# Patient Record
Sex: MALE | Race: WHITE | Employment: UNEMPLOYED | URBAN - METROPOLITAN AREA
[De-identification: names, ages, dates, MRNs, and addresses within clinical notes are randomized per-mention and may not be internally consistent; named-entity substitution may affect disease eponyms.]

---

## 2022-07-05 ENCOUNTER — APPOINTMENT (OUTPATIENT)
Dept: GENERAL RADIOLOGY | Age: 52
End: 2022-07-05
Payer: MEDICAID

## 2022-07-05 ENCOUNTER — HOSPITAL ENCOUNTER (OUTPATIENT)
Age: 52
Setting detail: OBSERVATION
Discharge: LEFT BEFORE DISCHARGE | End: 2022-07-06
Attending: EMERGENCY MEDICINE | Admitting: INTERNAL MEDICINE
Payer: MEDICAID

## 2022-07-05 ENCOUNTER — APPOINTMENT (OUTPATIENT)
Dept: CT IMAGING | Age: 52
End: 2022-07-05
Payer: MEDICAID

## 2022-07-05 DIAGNOSIS — R07.9 CHEST PAIN, UNSPECIFIED TYPE: Primary | ICD-10-CM

## 2022-07-05 PROBLEM — I20.0 UNSTABLE ANGINA (HCC): Status: ACTIVE | Noted: 2022-07-05

## 2022-07-05 PROBLEM — K75.9 HEPATITIS: Status: ACTIVE | Noted: 2022-07-05

## 2022-07-05 PROBLEM — I10 PRIMARY HYPERTENSION: Status: ACTIVE | Noted: 2022-07-05

## 2022-07-05 PROBLEM — I25.10 CORONARY ARTERY DISEASE INVOLVING NATIVE CORONARY ARTERY OF NATIVE HEART: Status: ACTIVE | Noted: 2022-07-05

## 2022-07-05 PROBLEM — E78.5 DYSLIPIDEMIA: Status: ACTIVE | Noted: 2022-07-05

## 2022-07-05 LAB
ABSOLUTE EOS #: 0.26 K/UL (ref 0–0.44)
ABSOLUTE IMMATURE GRANULOCYTE: 0.01 K/UL (ref 0–0.3)
ABSOLUTE LYMPH #: 2.43 K/UL (ref 1.1–3.7)
ABSOLUTE MONO #: 0.5 K/UL (ref 0.1–1.2)
ALBUMIN SERPL-MCNC: 3.7 G/DL (ref 3.5–5.2)
ALP BLD-CCNC: 76 U/L (ref 40–129)
ALT SERPL-CCNC: 92 U/L (ref 5–41)
ANION GAP SERPL CALCULATED.3IONS-SCNC: 9 MMOL/L (ref 9–17)
ANTI-XA UNFRAC HEPARIN: 0.12 IU/L (ref 0.3–0.7)
ANTI-XA UNFRAC HEPARIN: 0.14 IU/L (ref 0.3–0.7)
AST SERPL-CCNC: 62 U/L
BASOPHILS # BLD: 1 % (ref 0–2)
BASOPHILS ABSOLUTE: 0.04 K/UL (ref 0–0.2)
BILIRUB SERPL-MCNC: 0.45 MG/DL (ref 0.3–1.2)
BUN BLDV-MCNC: 15 MG/DL (ref 6–20)
BUN/CREAT BLD: 16 (ref 9–20)
CALCIUM SERPL-MCNC: 8.6 MG/DL (ref 8.6–10.4)
CHLORIDE BLD-SCNC: 105 MMOL/L (ref 98–107)
CO2: 23 MMOL/L (ref 20–31)
CREAT SERPL-MCNC: 0.96 MG/DL (ref 0.7–1.2)
EOSINOPHILS RELATIVE PERCENT: 4 % (ref 1–4)
GFR AFRICAN AMERICAN: >60 ML/MIN
GFR NON-AFRICAN AMERICAN: >60 ML/MIN
GFR SERPL CREATININE-BSD FRML MDRD: ABNORMAL ML/MIN/{1.73_M2}
GLUCOSE BLD-MCNC: 105 MG/DL (ref 70–99)
HCT VFR BLD CALC: 41.7 % (ref 40.7–50.3)
HEMOGLOBIN: 14.2 G/DL (ref 13–17)
IMMATURE GRANULOCYTES: 0 %
INR BLD: 1
LYMPHOCYTES # BLD: 35 % (ref 24–43)
MCH RBC QN AUTO: 30.9 PG (ref 25.2–33.5)
MCHC RBC AUTO-ENTMCNC: 34.1 G/DL (ref 28.4–34.8)
MCV RBC AUTO: 90.8 FL (ref 82.6–102.9)
MONOCYTES # BLD: 7 % (ref 3–12)
NRBC AUTOMATED: 0 PER 100 WBC
PARTIAL THROMBOPLASTIN TIME: 25.5 SEC (ref 23.9–33.8)
PDW BLD-RTO: 15.4 % (ref 11.8–14.4)
PLATELET # BLD: 222 K/UL (ref 138–453)
PMV BLD AUTO: 9.9 FL (ref 8.1–13.5)
POTASSIUM SERPL-SCNC: 4.4 MMOL/L (ref 3.7–5.3)
PRO-BNP: 482 PG/ML
PROTHROMBIN TIME: 13.1 SEC (ref 11.5–14.2)
RBC # BLD: 4.59 M/UL (ref 4.21–5.77)
RBC # BLD: ABNORMAL 10*6/UL
SEG NEUTROPHILS: 53 % (ref 36–65)
SEGMENTED NEUTROPHILS ABSOLUTE COUNT: 3.75 K/UL (ref 1.5–8.1)
SODIUM BLD-SCNC: 137 MMOL/L (ref 135–144)
TOTAL PROTEIN: 6.3 G/DL (ref 6.4–8.3)
TROPONIN, HIGH SENSITIVITY: 12 NG/L (ref 0–22)
TROPONIN, HIGH SENSITIVITY: 13 NG/L (ref 0–22)
TROPONIN, HIGH SENSITIVITY: 14 NG/L (ref 0–22)
WBC # BLD: 7 K/UL (ref 3.5–11.3)

## 2022-07-05 PROCEDURE — 6360000002 HC RX W HCPCS: Performed by: NURSE PRACTITIONER

## 2022-07-05 PROCEDURE — 2580000003 HC RX 258: Performed by: CLINICAL NURSE SPECIALIST

## 2022-07-05 PROCEDURE — 6360000002 HC RX W HCPCS: Performed by: INTERNAL MEDICINE

## 2022-07-05 PROCEDURE — 99285 EMERGENCY DEPT VISIT HI MDM: CPT

## 2022-07-05 PROCEDURE — 85730 THROMBOPLASTIN TIME PARTIAL: CPT

## 2022-07-05 PROCEDURE — 71260 CT THORAX DX C+: CPT | Performed by: EMERGENCY MEDICINE

## 2022-07-05 PROCEDURE — 2700000000 HC OXYGEN THERAPY PER DAY

## 2022-07-05 PROCEDURE — 96368 THER/DIAG CONCURRENT INF: CPT

## 2022-07-05 PROCEDURE — 96366 THER/PROPH/DIAG IV INF ADDON: CPT

## 2022-07-05 PROCEDURE — 96374 THER/PROPH/DIAG INJ IV PUSH: CPT

## 2022-07-05 PROCEDURE — 6360000002 HC RX W HCPCS: Performed by: EMERGENCY MEDICINE

## 2022-07-05 PROCEDURE — 2580000003 HC RX 258: Performed by: INTERNAL MEDICINE

## 2022-07-05 PROCEDURE — 2500000003 HC RX 250 WO HCPCS

## 2022-07-05 PROCEDURE — 93005 ELECTROCARDIOGRAM TRACING: CPT | Performed by: EMERGENCY MEDICINE

## 2022-07-05 PROCEDURE — 93306 TTE W/DOPPLER COMPLETE: CPT

## 2022-07-05 PROCEDURE — G0378 HOSPITAL OBSERVATION PER HR: HCPCS

## 2022-07-05 PROCEDURE — 2580000003 HC RX 258: Performed by: EMERGENCY MEDICINE

## 2022-07-05 PROCEDURE — 85610 PROTHROMBIN TIME: CPT

## 2022-07-05 PROCEDURE — 6370000000 HC RX 637 (ALT 250 FOR IP): Performed by: INTERNAL MEDICINE

## 2022-07-05 PROCEDURE — 85520 HEPARIN ASSAY: CPT

## 2022-07-05 PROCEDURE — 84484 ASSAY OF TROPONIN QUANT: CPT

## 2022-07-05 PROCEDURE — 93005 ELECTROCARDIOGRAM TRACING: CPT | Performed by: INTERNAL MEDICINE

## 2022-07-05 PROCEDURE — 99220 PR INITIAL OBSERVATION CARE/DAY 70 MINUTES: CPT | Performed by: INTERNAL MEDICINE

## 2022-07-05 PROCEDURE — 96375 TX/PRO/DX INJ NEW DRUG ADDON: CPT

## 2022-07-05 PROCEDURE — 80053 COMPREHEN METABOLIC PANEL: CPT

## 2022-07-05 PROCEDURE — 85025 COMPLETE CBC W/AUTO DIFF WBC: CPT

## 2022-07-05 PROCEDURE — 94761 N-INVAS EAR/PLS OXIMETRY MLT: CPT

## 2022-07-05 PROCEDURE — 83880 ASSAY OF NATRIURETIC PEPTIDE: CPT

## 2022-07-05 PROCEDURE — 6360000002 HC RX W HCPCS: Performed by: CLINICAL NURSE SPECIALIST

## 2022-07-05 PROCEDURE — 96365 THER/PROPH/DIAG IV INF INIT: CPT

## 2022-07-05 PROCEDURE — 71045 X-RAY EXAM CHEST 1 VIEW: CPT

## 2022-07-05 PROCEDURE — 6370000000 HC RX 637 (ALT 250 FOR IP): Performed by: CLINICAL NURSE SPECIALIST

## 2022-07-05 PROCEDURE — 96361 HYDRATE IV INFUSION ADD-ON: CPT

## 2022-07-05 PROCEDURE — 96376 TX/PRO/DX INJ SAME DRUG ADON: CPT

## 2022-07-05 PROCEDURE — 6360000004 HC RX CONTRAST MEDICATION: Performed by: EMERGENCY MEDICINE

## 2022-07-05 PROCEDURE — 36415 COLL VENOUS BLD VENIPUNCTURE: CPT

## 2022-07-05 PROCEDURE — 2500000003 HC RX 250 WO HCPCS: Performed by: INTERNAL MEDICINE

## 2022-07-05 RX ORDER — ONDANSETRON 2 MG/ML
4 INJECTION INTRAMUSCULAR; INTRAVENOUS EVERY 6 HOURS PRN
Status: DISCONTINUED | OUTPATIENT
Start: 2022-07-05 | End: 2022-07-06 | Stop reason: HOSPADM

## 2022-07-05 RX ORDER — LORAZEPAM 1 MG/1
3 TABLET ORAL
Status: DISCONTINUED | OUTPATIENT
Start: 2022-07-05 | End: 2022-07-06 | Stop reason: HOSPADM

## 2022-07-05 RX ORDER — POTASSIUM CHLORIDE 7.45 MG/ML
10 INJECTION INTRAVENOUS PRN
Status: DISCONTINUED | OUTPATIENT
Start: 2022-07-05 | End: 2022-07-06 | Stop reason: HOSPADM

## 2022-07-05 RX ORDER — METOPROLOL SUCCINATE 25 MG/1
25 TABLET, EXTENDED RELEASE ORAL DAILY
COMMUNITY
Start: 2022-06-17 | End: 2022-07-06 | Stop reason: SDUPTHER

## 2022-07-05 RX ORDER — NITROGLYCERIN 0.4 MG/1
0.4 TABLET SUBLINGUAL EVERY 5 MIN PRN
Status: DISCONTINUED | OUTPATIENT
Start: 2022-07-05 | End: 2022-07-06 | Stop reason: HOSPADM

## 2022-07-05 RX ORDER — 0.9 % SODIUM CHLORIDE 0.9 %
80 INTRAVENOUS SOLUTION INTRAVENOUS ONCE
Status: COMPLETED | OUTPATIENT
Start: 2022-07-05 | End: 2022-07-05

## 2022-07-05 RX ORDER — SODIUM CHLORIDE 9 MG/ML
INJECTION, SOLUTION INTRAVENOUS PRN
Status: DISCONTINUED | OUTPATIENT
Start: 2022-07-05 | End: 2022-07-06 | Stop reason: HOSPADM

## 2022-07-05 RX ORDER — CLONIDINE HYDROCHLORIDE 0.1 MG/1
0.1 TABLET ORAL 3 TIMES DAILY
Status: ON HOLD | COMMUNITY
Start: 2022-06-03 | End: 2022-07-14 | Stop reason: HOSPADM

## 2022-07-05 RX ORDER — SODIUM CHLORIDE 0.9 % (FLUSH) 0.9 %
10 SYRINGE (ML) INJECTION PRN
Status: DISCONTINUED | OUTPATIENT
Start: 2022-07-05 | End: 2022-07-05

## 2022-07-05 RX ORDER — MAGNESIUM SULFATE 1 G/100ML
1000 INJECTION INTRAVENOUS PRN
Status: DISCONTINUED | OUTPATIENT
Start: 2022-07-05 | End: 2022-07-06 | Stop reason: HOSPADM

## 2022-07-05 RX ORDER — FENTANYL CITRATE 50 UG/ML
100 INJECTION, SOLUTION INTRAMUSCULAR; INTRAVENOUS ONCE
Status: COMPLETED | OUTPATIENT
Start: 2022-07-05 | End: 2022-07-05

## 2022-07-05 RX ORDER — SODIUM CHLORIDE 9 MG/ML
INJECTION, SOLUTION INTRAVENOUS CONTINUOUS
Status: DISCONTINUED | OUTPATIENT
Start: 2022-07-05 | End: 2022-07-06 | Stop reason: HOSPADM

## 2022-07-05 RX ORDER — LORAZEPAM 2 MG/ML
1 INJECTION INTRAMUSCULAR EVERY 4 HOURS PRN
Status: DISCONTINUED | OUTPATIENT
Start: 2022-07-05 | End: 2022-07-06 | Stop reason: HOSPADM

## 2022-07-05 RX ORDER — ONDANSETRON 4 MG/1
4 TABLET, ORALLY DISINTEGRATING ORAL EVERY 8 HOURS PRN
Status: DISCONTINUED | OUTPATIENT
Start: 2022-07-05 | End: 2022-07-06 | Stop reason: HOSPADM

## 2022-07-05 RX ORDER — LORAZEPAM 2 MG/ML
3 INJECTION INTRAMUSCULAR
Status: DISCONTINUED | OUTPATIENT
Start: 2022-07-05 | End: 2022-07-06 | Stop reason: HOSPADM

## 2022-07-05 RX ORDER — LORAZEPAM 2 MG/ML
4 INJECTION INTRAMUSCULAR
Status: DISCONTINUED | OUTPATIENT
Start: 2022-07-05 | End: 2022-07-06 | Stop reason: HOSPADM

## 2022-07-05 RX ORDER — LORAZEPAM 1 MG/1
1 TABLET ORAL
Status: DISCONTINUED | OUTPATIENT
Start: 2022-07-05 | End: 2022-07-06 | Stop reason: HOSPADM

## 2022-07-05 RX ORDER — FENTANYL CITRATE 50 UG/ML
100 INJECTION, SOLUTION INTRAMUSCULAR; INTRAVENOUS
Status: DISCONTINUED | OUTPATIENT
Start: 2022-07-05 | End: 2022-07-05

## 2022-07-05 RX ORDER — SODIUM CHLORIDE 9 MG/ML
INJECTION, SOLUTION INTRAVENOUS PRN
Status: DISCONTINUED | OUTPATIENT
Start: 2022-07-05 | End: 2022-07-05 | Stop reason: SDUPTHER

## 2022-07-05 RX ORDER — SODIUM CHLORIDE 0.9 % (FLUSH) 0.9 %
10 SYRINGE (ML) INJECTION PRN
Status: DISCONTINUED | OUTPATIENT
Start: 2022-07-05 | End: 2022-07-06 | Stop reason: HOSPADM

## 2022-07-05 RX ORDER — ENOXAPARIN SODIUM 100 MG/ML
40 INJECTION SUBCUTANEOUS DAILY
Status: DISCONTINUED | OUTPATIENT
Start: 2022-07-05 | End: 2022-07-05

## 2022-07-05 RX ORDER — ATORVASTATIN CALCIUM 40 MG/1
40 TABLET, FILM COATED ORAL NIGHTLY
Status: DISCONTINUED | OUTPATIENT
Start: 2022-07-05 | End: 2022-07-06 | Stop reason: HOSPADM

## 2022-07-05 RX ORDER — BUPROPION HYDROCHLORIDE 100 MG/1
100 TABLET ORAL 3 TIMES DAILY
Status: ON HOLD | COMMUNITY
Start: 2022-06-03 | End: 2022-07-12

## 2022-07-05 RX ORDER — LORAZEPAM 1 MG/1
4 TABLET ORAL
Status: DISCONTINUED | OUTPATIENT
Start: 2022-07-05 | End: 2022-07-06 | Stop reason: HOSPADM

## 2022-07-05 RX ORDER — 0.9 % SODIUM CHLORIDE 0.9 %
1000 INTRAVENOUS SOLUTION INTRAVENOUS ONCE
Status: COMPLETED | OUTPATIENT
Start: 2022-07-05 | End: 2022-07-05

## 2022-07-05 RX ORDER — SODIUM CHLORIDE 0.9 % (FLUSH) 0.9 %
5-40 SYRINGE (ML) INJECTION EVERY 12 HOURS SCHEDULED
Status: DISCONTINUED | OUTPATIENT
Start: 2022-07-05 | End: 2022-07-06 | Stop reason: HOSPADM

## 2022-07-05 RX ORDER — SODIUM CHLORIDE 0.9 % (FLUSH) 0.9 %
5-40 SYRINGE (ML) INJECTION EVERY 12 HOURS SCHEDULED
Status: DISCONTINUED | OUTPATIENT
Start: 2022-07-05 | End: 2022-07-05 | Stop reason: SDUPTHER

## 2022-07-05 RX ORDER — LORAZEPAM 2 MG/ML
1 INJECTION INTRAMUSCULAR ONCE
Status: COMPLETED | OUTPATIENT
Start: 2022-07-05 | End: 2022-07-05

## 2022-07-05 RX ORDER — HEPARIN SODIUM 1000 [USP'U]/ML
4000 INJECTION, SOLUTION INTRAVENOUS; SUBCUTANEOUS ONCE
Status: COMPLETED | OUTPATIENT
Start: 2022-07-05 | End: 2022-07-05

## 2022-07-05 RX ORDER — ATORVASTATIN CALCIUM 80 MG/1
80 TABLET, FILM COATED ORAL NIGHTLY
COMMUNITY
Start: 2022-06-17

## 2022-07-05 RX ORDER — HEPARIN SODIUM 1000 [USP'U]/ML
2000 INJECTION, SOLUTION INTRAVENOUS; SUBCUTANEOUS PRN
Status: DISCONTINUED | OUTPATIENT
Start: 2022-07-05 | End: 2022-07-06 | Stop reason: HOSPADM

## 2022-07-05 RX ORDER — ACETAMINOPHEN 650 MG/1
650 SUPPOSITORY RECTAL EVERY 6 HOURS PRN
Status: DISCONTINUED | OUTPATIENT
Start: 2022-07-05 | End: 2022-07-06 | Stop reason: HOSPADM

## 2022-07-05 RX ORDER — SPIRONOLACTONE 25 MG/1
25 TABLET ORAL DAILY
COMMUNITY
Start: 2022-06-17

## 2022-07-05 RX ORDER — POTASSIUM CHLORIDE 20 MEQ/1
40 TABLET, EXTENDED RELEASE ORAL PRN
Status: DISCONTINUED | OUTPATIENT
Start: 2022-07-05 | End: 2022-07-06 | Stop reason: HOSPADM

## 2022-07-05 RX ORDER — LORAZEPAM 1 MG/1
2 TABLET ORAL
Status: DISCONTINUED | OUTPATIENT
Start: 2022-07-05 | End: 2022-07-06 | Stop reason: HOSPADM

## 2022-07-05 RX ORDER — ASPIRIN 81 MG/1
81 TABLET, CHEWABLE ORAL DAILY
Status: DISCONTINUED | OUTPATIENT
Start: 2022-07-06 | End: 2022-07-06 | Stop reason: HOSPADM

## 2022-07-05 RX ORDER — NITROGLYCERIN 20 MG/100ML
INJECTION INTRAVENOUS
Status: COMPLETED
Start: 2022-07-05 | End: 2022-07-05

## 2022-07-05 RX ORDER — NITROGLYCERIN 20 MG/100ML
5-200 INJECTION INTRAVENOUS CONTINUOUS
Status: DISCONTINUED | OUTPATIENT
Start: 2022-07-05 | End: 2022-07-06 | Stop reason: HOSPADM

## 2022-07-05 RX ORDER — LORAZEPAM 2 MG/ML
1 INJECTION INTRAMUSCULAR
Status: DISCONTINUED | OUTPATIENT
Start: 2022-07-05 | End: 2022-07-06 | Stop reason: HOSPADM

## 2022-07-05 RX ORDER — FENTANYL CITRATE 50 UG/ML
50 INJECTION, SOLUTION INTRAMUSCULAR; INTRAVENOUS
Status: DISCONTINUED | OUTPATIENT
Start: 2022-07-05 | End: 2022-07-05

## 2022-07-05 RX ORDER — HEPARIN SODIUM 1000 [USP'U]/ML
4000 INJECTION, SOLUTION INTRAVENOUS; SUBCUTANEOUS PRN
Status: DISCONTINUED | OUTPATIENT
Start: 2022-07-05 | End: 2022-07-06 | Stop reason: HOSPADM

## 2022-07-05 RX ORDER — HEPARIN SODIUM 10000 [USP'U]/100ML
5-30 INJECTION, SOLUTION INTRAVENOUS CONTINUOUS
Status: DISCONTINUED | OUTPATIENT
Start: 2022-07-05 | End: 2022-07-06 | Stop reason: HOSPADM

## 2022-07-05 RX ORDER — ASPIRIN 81 MG
81 TABLET,CHEWABLE ORAL DAILY
COMMUNITY
Start: 2022-06-17

## 2022-07-05 RX ORDER — LORAZEPAM 2 MG/ML
2 INJECTION INTRAMUSCULAR
Status: DISCONTINUED | OUTPATIENT
Start: 2022-07-05 | End: 2022-07-06 | Stop reason: HOSPADM

## 2022-07-05 RX ORDER — ACETAMINOPHEN 325 MG/1
650 TABLET ORAL EVERY 6 HOURS PRN
Status: DISCONTINUED | OUTPATIENT
Start: 2022-07-05 | End: 2022-07-06 | Stop reason: HOSPADM

## 2022-07-05 RX ORDER — SODIUM CHLORIDE 0.9 % (FLUSH) 0.9 %
5-40 SYRINGE (ML) INJECTION PRN
Status: DISCONTINUED | OUTPATIENT
Start: 2022-07-05 | End: 2022-07-05 | Stop reason: SDUPTHER

## 2022-07-05 RX ORDER — LISINOPRIL 2.5 MG/1
2.5 TABLET ORAL DAILY
COMMUNITY
Start: 2022-06-17

## 2022-07-05 RX ADMIN — HEPARIN SODIUM AND DEXTROSE 11.14 UNITS/KG/HR: 10000; 5 INJECTION INTRAVENOUS at 08:57

## 2022-07-05 RX ADMIN — HEPARIN SODIUM 4000 UNITS: 1000 INJECTION INTRAVENOUS; SUBCUTANEOUS at 08:57

## 2022-07-05 RX ADMIN — HYDROMORPHONE HYDROCHLORIDE 0.5 MG: 1 INJECTION, SOLUTION INTRAMUSCULAR; INTRAVENOUS; SUBCUTANEOUS at 23:54

## 2022-07-05 RX ADMIN — LORAZEPAM 1 MG: 2 INJECTION INTRAMUSCULAR; INTRAVENOUS at 04:40

## 2022-07-05 RX ADMIN — IOPAMIDOL 75 ML: 755 INJECTION, SOLUTION INTRAVENOUS at 05:44

## 2022-07-05 RX ADMIN — FENTANYL CITRATE 100 MCG: 50 INJECTION, SOLUTION INTRAMUSCULAR; INTRAVENOUS at 02:32

## 2022-07-05 RX ADMIN — ONDANSETRON 4 MG: 2 INJECTION INTRAMUSCULAR; INTRAVENOUS at 10:20

## 2022-07-05 RX ADMIN — LORAZEPAM 1 MG: 2 INJECTION INTRAMUSCULAR at 15:07

## 2022-07-05 RX ADMIN — NITROGLYCERIN 20 MCG/MIN: 20 INJECTION INTRAVENOUS at 08:40

## 2022-07-05 RX ADMIN — LORAZEPAM 1 MG: 2 INJECTION INTRAMUSCULAR at 10:55

## 2022-07-05 RX ADMIN — SODIUM CHLORIDE: 9 INJECTION, SOLUTION INTRAVENOUS at 21:41

## 2022-07-05 RX ADMIN — HEPARIN SODIUM 2000 UNITS: 1000 INJECTION INTRAVENOUS; SUBCUTANEOUS at 16:35

## 2022-07-05 RX ADMIN — HYDROMORPHONE HYDROCHLORIDE 0.5 MG: 1 INJECTION, SOLUTION INTRAMUSCULAR; INTRAVENOUS; SUBCUTANEOUS at 14:29

## 2022-07-05 RX ADMIN — FENTANYL CITRATE 50 MCG: 50 INJECTION, SOLUTION INTRAMUSCULAR; INTRAVENOUS at 09:32

## 2022-07-05 RX ADMIN — LORAZEPAM 1 MG: 2 INJECTION INTRAMUSCULAR; INTRAVENOUS at 06:28

## 2022-07-05 RX ADMIN — FENTANYL CITRATE 50 MCG: 50 INJECTION, SOLUTION INTRAMUSCULAR; INTRAVENOUS at 08:25

## 2022-07-05 RX ADMIN — Medication 0.4 MG: at 07:52

## 2022-07-05 RX ADMIN — ATORVASTATIN CALCIUM 40 MG: 40 TABLET, FILM COATED ORAL at 20:13

## 2022-07-05 RX ADMIN — FENTANYL CITRATE 100 MCG: 50 INJECTION, SOLUTION INTRAMUSCULAR; INTRAVENOUS at 03:33

## 2022-07-05 RX ADMIN — NITROGLYCERIN 100 MCG/MIN: 20 INJECTION INTRAVENOUS at 18:49

## 2022-07-05 RX ADMIN — SODIUM CHLORIDE, PRESERVATIVE FREE 10 ML: 5 INJECTION INTRAVENOUS at 20:14

## 2022-07-05 RX ADMIN — LORAZEPAM 2 MG: 2 INJECTION INTRAMUSCULAR at 20:13

## 2022-07-05 RX ADMIN — SODIUM CHLORIDE: 9 INJECTION, SOLUTION INTRAVENOUS at 08:00

## 2022-07-05 RX ADMIN — SODIUM CHLORIDE 80 ML: 9 INJECTION, SOLUTION INTRAVENOUS at 05:44

## 2022-07-05 RX ADMIN — Medication 0.4 MG: at 08:21

## 2022-07-05 RX ADMIN — SODIUM CHLORIDE, PRESERVATIVE FREE 10 ML: 5 INJECTION INTRAVENOUS at 05:44

## 2022-07-05 RX ADMIN — HYDROMORPHONE HYDROCHLORIDE 0.5 MG: 1 INJECTION, SOLUTION INTRAMUSCULAR; INTRAVENOUS; SUBCUTANEOUS at 10:55

## 2022-07-05 RX ADMIN — SODIUM CHLORIDE, PRESERVATIVE FREE 10 ML: 5 INJECTION INTRAVENOUS at 08:01

## 2022-07-05 RX ADMIN — SODIUM CHLORIDE 1000 ML: 9 INJECTION, SOLUTION INTRAVENOUS at 02:35

## 2022-07-05 RX ADMIN — LORAZEPAM 2 MG: 2 INJECTION INTRAMUSCULAR at 21:02

## 2022-07-05 RX ADMIN — ACETAMINOPHEN 650 MG: 325 TABLET ORAL at 10:20

## 2022-07-05 RX ADMIN — HYDROMORPHONE HYDROCHLORIDE 0.5 MG: 1 INJECTION, SOLUTION INTRAMUSCULAR; INTRAVENOUS; SUBCUTANEOUS at 18:19

## 2022-07-05 ASSESSMENT — PAIN DESCRIPTION - LOCATION
LOCATION: CHEST
LOCATION: HEAD
LOCATION: CHEST

## 2022-07-05 ASSESSMENT — PAIN SCALES - GENERAL
PAINLEVEL_OUTOF10: 8
PAINLEVEL_OUTOF10: 8
PAINLEVEL_OUTOF10: 10
PAINLEVEL_OUTOF10: 8
PAINLEVEL_OUTOF10: 5
PAINLEVEL_OUTOF10: 5
PAINLEVEL_OUTOF10: 7
PAINLEVEL_OUTOF10: 5
PAINLEVEL_OUTOF10: 7
PAINLEVEL_OUTOF10: 8
PAINLEVEL_OUTOF10: 7
PAINLEVEL_OUTOF10: 7
PAINLEVEL_OUTOF10: 5
PAINLEVEL_OUTOF10: 5
PAINLEVEL_OUTOF10: 8
PAINLEVEL_OUTOF10: 5
PAINLEVEL_OUTOF10: 7

## 2022-07-05 ASSESSMENT — PAIN DESCRIPTION - PAIN TYPE
TYPE: CHRONIC PAIN
TYPE: ACUTE PAIN

## 2022-07-05 ASSESSMENT — PAIN DESCRIPTION - DESCRIPTORS
DESCRIPTORS: SHARP
DESCRIPTORS: SHARP;STABBING
DESCRIPTORS: SHARP
DESCRIPTORS: SHARP
DESCRIPTORS: PENETRATING;STABBING
DESCRIPTORS: SHARP;STABBING
DESCRIPTORS: SHARP
DESCRIPTORS: SHOOTING;STABBING
DESCRIPTORS: SHARP

## 2022-07-05 ASSESSMENT — PAIN DESCRIPTION - FREQUENCY
FREQUENCY: CONTINUOUS
FREQUENCY: INTERMITTENT

## 2022-07-05 ASSESSMENT — PAIN DESCRIPTION - ONSET
ONSET: ON-GOING

## 2022-07-05 ASSESSMENT — PAIN DESCRIPTION - ORIENTATION
ORIENTATION: LEFT
ORIENTATION: LEFT;MID
ORIENTATION: LEFT;MID
ORIENTATION: MID
ORIENTATION: MID;LEFT

## 2022-07-05 ASSESSMENT — PAIN - FUNCTIONAL ASSESSMENT: PAIN_FUNCTIONAL_ASSESSMENT: 0-10

## 2022-07-05 NOTE — CONSULTS
[facility]  CONSULT NOTE                  Date:   7/5/2022  Patient name: Johana Brice  Date of admission:  7/5/2022  2:24 AM  MRN:   [id number]  YOB: 1970    Reason for Admission: Unstable angina    CHIEF COMPLAINT:    Chest pain    History Obtained From:  Patient and chart review     HISTORY OF PRESENT ILLNESS:       76-year-old male known history of CAD status post multiple PCIs and CABG x 2 in 2016 and chronic HFrEF (last known EF 30%) with dual chamber ICD in situ (most of the cardiac care at [facility]) presented to ER with intermittent substernal chest discomfort started 2 days ago, radiating to the neck, feels like pressure. Currently the pain is constant since admission. He is getting iv fentanyl, ativan and Dilaudid intermittently every 1-2 hours. He reports that only ativan and dilaudid combination works for him. He denies any drug/substance abuse. Initial ECG showed A-paced rhythm with anterolateral infarct which is same as compared to previous ecg in 03/2022. Serial troponins negative so far. Patient appeared comfortable and sleeping during my encounter but started to complain of 10/10 pain on waking up. Past Medical History:   has a past medical history of AA (alcohol abuse), Coronary artery disease involving native coronary artery of native heart, Drug abuse ([address] Dyslipidemia, Hepatitis, and Primary hypertension. Past Surgical History:   has a past surgical history that includes Coronary artery bypass graft; Cardiac catheterization; and Cardiac defibrillator placement. Home Medications:    Prior to Admission medications    Medication Sig Start Date End Date Taking?  Authorizing Provider   ASPIRIN LOW DOSE 81 MG chewable tablet Take 81 mg by mouth daily 6/17/22   Historical Provider, MD   atorvastatin (LIPITOR) 80 MG tablet Take 80 mg by mouth nightly 6/17/22   Historical Provider, MD   buPROPion (WELLBUTRIN) 100 MG tablet Take 100 mg by mouth in the morning, at noon, and at bedtime 6/3/22   Historical Provider, MD   cloNIDine (CATAPRES) 0.1 MG tablet Take 0.1 mg by mouth in the morning, at noon, and at bedtime 6/3/22   Historical Provider, MD   lisinopril (PRINIVIL;ZESTRIL) 2.5 MG tablet Take 2.5 mg by mouth daily 6/17/22   Historical Provider, MD   metoprolol succinate (TOPROL XL) 25 MG extended release tablet Take 25 mg by mouth daily 6/17/22   Historical Provider, MD   spironolactone (ALDACTONE) 25 MG tablet Take 25 mg by mouth daily 6/17/22   Historical Provider, MD       Allergies:  Morphine    Social History:   reports that he has never smoked. He has never used smokeless tobacco. He reports previous alcohol use. Family History:    negative for early CAD    REVIEW OF SYSTEMS:    · Constitutional: there has been no unanticipated weight loss. No change in functional capacity. · Eyes: No visual changes or diplopia. · ENT: No Headaches, hearing loss or vertigo. No mouth sores or sore throat. · Cardiovascular: +ve constant chest pain as mentioned above,no dyspnea, orthopnea, palpitations or pre syncope  · Respiratory: No hx of productive cough, pleuritic chest pain   · Gastrointestinal: No abdominal pain, appetite loss, blood in stools. No change in bowel habits. · Genitourinary: No dysuria, trouble voiding, or hematuria. · Musculoskeletal:  No gait disturbance, No weakness or joint complaints. · Integumentary: No rash or pruritis. · Neurological: No headache, weakness, numbness or tingling. No change in gait, balance, coordination. · Psychiatric: No anxiety, or depression. · Endocrine: No temperature intolerance. No excessive thirst, fluid intake, or urination. No tremor. · Hematologic/Lymphatic: No abnormal bruising or bleeding, blood clots or swollen lymph nodes. · Allergic/Immunologic: No nasal congestion or hives.     PHYSICAL EXAM:    Physical Examination:      /72   Pulse 66   Temp 97.5 °F (36.4 °C) (Temporal)   Resp 22 history       RECOMMENDATIONS:  · Retrieve all prior medical and cardiac records  · Continue asa, statin, heparin drip   · Wean off nitro infusion  · Limited echocardiogram   · ICD interrogation   · Consider checking Urine tox screen   · Will consider repeat ischemia work up after reviewing recent cardiac testing. Discussed with patient and nursing.       Joan Chowdary MD, [facility]

## 2022-07-05 NOTE — FLOWSHEET NOTE
07/05/22 8602   Treatment Team Notification   Reason for Communication Critical results   Type of Critical Result Cardiology   Critical Cardiology Result Type Other (comment)  (c/o chest pain w/Nitro & fentanyl)   Team Member Name [id number]  [facility]   Treatment Team Role Consulting Provider   Method of Communication Call   Response Waiting for response   Notification Time [phone] with  would like Nitro gtt titrated for chest pain and will be here around 1230pm to see/evaluate.

## 2022-07-05 NOTE — PROGRESS NOTES
Pt up out of bed walking around room. Pt educated on need to stay in bed for safety and due to medical treatment plan for dx.   Pt less than receptive to requests from RN but did eventually comply with request.

## 2022-07-05 NOTE — FLOWSHEET NOTE
07/05/22 1032   Treatment Team Notification   Reason for Communication Evaluate; Review case   Team Member Name [address] [place] [address] Team Role Attending Provider   Method of Communication Face to face   Response In department   Notification Time [phone]     Updated on pt c/o chest pain and request for dilauid, spoke with cardiologist will be her 1230pm.

## 2022-07-05 NOTE — H&P
[facility]  Office: [address], DO, Surendra Parnell, DO, Alonzo [facility], DO, Nelson Dailey Blood, DO, Dolores Hopkins MD, Lety Cha MD, Héctor Keita MD, Huong Andrade MD, Jevon Salazar MD, Nanette Chauhna MD, Mackenzie Walden MD, Kirill Fair, DO, Ignacia Jacobo MD,  Guillaume Singh DO, Mariajose Guzman MD, Janet Dao MD, Andreas Braden, DO, Patricio Dc MD, Ney Haywood MD, Katie Hurt, DO, Darshana Rubio MD, Kandy Briceño MD, Ancelmo Bass CNP, [facility], [facility], Miki Kumar, CNP, Joanie Feldman, CNP, Gabe Caicedo, [facility], Dia Contreras, CNP, Carmelita Morris PA-C, Denver Parks, DNP, Kayla Camarillo, CNP, Marc Sanders, CNP, Vamshi Landrum, CNP, Germain Law, CNS, Med Ramirez, [facility], Mundo Redding, CNP, Dony Orlando, CNP, Carlitos Durant, [facility]    HISTORY AND PHYSICAL EXAMINATION            Date:   7/5/2022  Patient name:  Arabella Nur  Date of admission:  7/5/2022  2:24 AM  MRN:   [id number]  Account:  [de-identified]  YOB: 1970  PCP:    No primary care provider on file. Room:   2027/2027-01  Code Status:    Full Code    Chief Complaint:     Chief Complaint   Patient presents with    Chest Pain       History Obtained From:     patient    History of Present Illness:     Arabella Nur is a 45 y.o. Non- / non  male who presents with Chest Pain   and is admitted to the hospital for the management of Unstable angina ([facility] [address] This is a 79-year-old male with known history of coronary disease with prior bypass grafting x2 vessels as well as stents x5. He presents with sudden onset of chest pain that woke him from sleep with radiation to his left neck and jaw with associated shortness of breath and diaphoresis. He has had some relief with fentanyl and nitroglycerin in the emergency room. He has ongoing episode of chest pain throughout the morning.   He had a recent work-up approximately 2 weeks ago at [facility] had facility his AICD fired. AICD may have fired inappropriately and subsequent has been adjusted. Past Medical History:     Past Medical History:   Diagnosis Date    Coronary artery disease involving native coronary artery of native heart     Dyslipidemia     Hepatitis     Primary hypertension         Past Surgical History:     Past Surgical History:   Procedure Laterality Date    CARDIAC CATHETERIZATION      history of 5 stents    CARDIAC DEFIBRILLATOR PLACEMENT      CORONARY ARTERY BYPASS GRAFT      2 vessel        Medications Prior to Admission:     Prior to Admission medications    Medication Sig Start Date End Date Taking? Authorizing Provider   ASPIRIN LOW DOSE 81 MG chewable tablet Take 81 mg by mouth daily 6/17/22   Historical Provider, MD   atorvastatin (LIPITOR) 80 MG tablet Take 80 mg by mouth nightly 6/17/22   Historical Provider, MD   buPROPion (WELLBUTRIN) 100 MG tablet Take 100 mg by mouth in the morning, at noon, and at bedtime 6/3/22   Historical Provider, MD   cloNIDine (CATAPRES) 0.1 MG tablet Take 0.1 mg by mouth in the morning, at noon, and at bedtime 6/3/22   Historical Provider, MD   lisinopril (PRINIVIL;ZESTRIL) 2.5 MG tablet Take 2.5 mg by mouth daily 6/17/22   Historical Provider, MD   metoprolol succinate (TOPROL XL) 25 MG extended release tablet Take 25 mg by mouth daily 6/17/22   Historical Provider, MD   spironolactone (ALDACTONE) 25 MG tablet Take 25 mg by mouth daily 6/17/22   Historical Provider, MD        Allergies:     Morphine    Social History:     Tobacco:    reports that he has never smoked. He has never used smokeless tobacco.  Alcohol:      reports previous alcohol use. Drug Use:  has no history on file for drug use. Family History:     History reviewed. No pertinent family history. Positive for heart disease    Review of Systems:     Positive and Negative as described in HPI.     CONSTITUTIONAL: negative for fevers, chills, sweats, fatigue, weight loss  HEENT:  negative for vision, hearing changes, runny nose, throat pain  RESPIRATORY: Positive for shortness of breath, cough, negative for congestion, wheezing  CARDIOVASCULAR: Positive for chest pain, negative for palpitations  GASTROINTESTINAL:  negative for nausea, vomiting, diarrhea, constipation, change in bowel habits, abdominal pain   GENITOURINARY:  negative for difficulty of urination, burning with urination, frequency   INTEGUMENT:  negative for rash, skin lesions, easy bruising   HEMATOLOGIC/LYMPHATIC:  negative for swelling/edema   ALLERGIC/IMMUNOLOGIC:  negative for urticaria , itching  ENDOCRINE:  negative increase in drinking, increase in urination, hot or cold intolerance  MUSCULOSKELETAL:  negative joint pains, muscle aches, swelling of joints  NEUROLOGICAL:  negative for headaches, dizziness, lightheadedness, numbness, pain, tingling extremities  BEHAVIOR/PSYCH:  negative for depression, anxiety    Physical Exam:   /72   Pulse 59   Temp 97.7 °F (36.5 °C) (Temporal)   Resp 20   Ht 5' 9\" (1.753 m)   Wt 198 lb (89.8 kg)   SpO2 100%   BMI 29.24 kg/m²   Temp (24hrs), Av.6 °F (36.4 °C), Min:97.5 °F (36.4 °C), Max:97.7 °F (36.5 °C)    No results for input(s): POCGLU in the last 72 hours.     Intake/Output Summary (Last 24 hours) at 2022 1329  Last data filed at 2022 1152  Gross per 24 hour   Intake --   Output 600 ml   Net -600 ml       General Appearance:  alert, well appearing, and in mild acute distress due to pain  Mental status: oriented to person, place, and time  Head:  normocephalic, atraumatic  Eye: no icterus, redness, pupils equal and reactive, extraocular eye movements intact, conjunctiva clear  Ear: normal external ear, no discharge, hearing intact  Nose:  no drainage noted  Mouth: mucous membranes moist  Neck: supple, no carotid bruits, thyroid not palpable  Lungs: Bilateral equal air entry, clear to auscultation, no wheezing, rales or rhonchi, normal effort  Cardiovascular: normal rate, regular rhythm, no murmur, gallop, rub.   Abdomen: Soft, nontender, nondistended, normal bowel sounds, no hepatomegaly or splenomegaly  Neurologic: There are no new focal motor or sensory deficits, normal muscle tone and bulk, no abnormal sensation, normal speech, cranial nerves II through XII grossly intact  Skin: No gross lesions, rashes, bruising or bleeding on exposed skin area  Extremities:  peripheral pulses palpable, no pedal edema or calf pain with palpation  Psych: normal affect     Investigations:      Laboratory Testing:  Recent Results (from the past 24 hour(s))   EKG 12 Lead    Collection Time: 07/05/22  2:18 AM   Result Value Ref Range    Ventricular Rate 77 BPM    Atrial Rate 77 BPM    QRS Duration 126 ms    Q-T Interval 418 ms    QTc Calculation (Bazett) 473 ms    R Axis 95 degrees    T Axis -17 degrees   CBC with Auto Differential    Collection Time: 07/05/22  2:24 AM   Result Value Ref Range    WBC 7.0 3.5 - 11.3 k/uL    RBC 4.59 4.21 - 5.77 m/uL    Hemoglobin 14.2 13.0 - 17.0 g/dL    Hematocrit 41.7 40.7 - 50.3 %    MCV 90.8 82.6 - 102.9 fL    MCH 30.9 25.2 - 33.5 pg    MCHC 34.1 28.4 - 34.8 g/dL    RDW 15.4 (H) 11.8 - 14.4 %    Platelets 822 495 - 343 k/uL    MPV 9.9 8.1 - 13.5 fL    NRBC Automated 0.0 0.0 per 100 WBC    Seg Neutrophils 53 36 - 65 %    Lymphocytes 35 24 - 43 %    Monocytes 7 3 - 12 %    Eosinophils % 4 1 - 4 %    Basophils 1 0 - 2 %    Immature Granulocytes 0 0 %    Segs Absolute 3.75 1.50 - 8.10 k/uL    Absolute Lymph # 2.43 1.10 - 3.70 k/uL    Absolute Mono # 0.50 0.10 - 1.20 k/uL    Absolute Eos # 0.26 0.00 - 0.44 k/uL    Basophils Absolute 0.04 0.00 - 0.20 k/uL    Absolute Immature Granulocyte 0.01 0.00 - 0.30 k/uL    RBC Morphology ANISOCYTOSIS PRESENT    Comprehensive Metabolic Panel w/ Reflex to MG    Collection Time: 07/05/22  2:24 AM   Result Value Ref Range    Glucose 105 (H) 70 - 99 mg/dL    BUN 15 6 - 20 mg/dL    CREATININE 0.96 0.70 - 1.20 mg/dL    Bun/Cre Ratio 16 9 - 20    Calcium 8.6 8.6 - 10.4 mg/dL    Sodium 137 135 - 144 mmol/L    Potassium 4.4 3.7 - 5.3 mmol/L    Chloride 105 98 - 107 mmol/L    CO2 23 20 - 31 mmol/L    Anion Gap 9 9 - 17 mmol/L    Alkaline Phosphatase 76 40 - 129 U/L    ALT 92 (H) 5 - 41 U/L    AST 62 (H) <40 U/L    Total Bilirubin 0.45 0.3 - 1.2 mg/dL    Total Protein 6.3 (L) 6.4 - 8.3 g/dL    Albumin 3.7 3.5 - 5.2 g/dL    GFR Non-African American >60 >60 mL/min    GFR African American >60 >60 mL/min    GFR Comment         Troponin    Collection Time: 07/05/22  2:24 AM   Result Value Ref Range    Troponin, High Sensitivity 13 0 - 22 ng/L   Brain Natriuretic Peptide    Collection Time: 07/05/22  2:24 AM   Result Value Ref Range    Pro- (H) <300 pg/mL   EKG 12 Lead    Collection Time: 07/05/22  2:48 AM   Result Value Ref Range    Ventricular Rate 66 BPM    Atrial Rate 66 BPM    P-R Interval 158 ms    QRS Duration 98 ms    Q-T Interval 400 ms    QTc Calculation (Bazett) 419 ms    P Axis 68 degrees    R Axis 84 degrees    T Axis 115 degrees   Troponin    Collection Time: 07/05/22  4:25 AM   Result Value Ref Range    Troponin, High Sensitivity 14 0 - 22 ng/L   Troponin    Collection Time: 07/05/22  8:29 AM   Result Value Ref Range    Troponin, High Sensitivity 13 0 - 22 ng/L   APTT    Collection Time: 07/05/22  8:33 AM   Result Value Ref Range    PTT 25.5 23.9 - 33.8 sec   Protime-INR    Collection Time: 07/05/22  8:33 AM   Result Value Ref Range    Protime 13.1 11.5 - 14.2 sec    INR 1.0    Troponin    Collection Time: 07/05/22 12:46 PM   Result Value Ref Range    Troponin, High Sensitivity 12 0 - 22 ng/L       Imaging/Diagnostics:  XR CHEST PORTABLE    Result Date: 7/5/2022  Mild cardiomegaly status post CABG and ICD placement. Otherwise negative exam.     CT CHEST PULMONARY EMBOLISM W CONTRAST    Result Date: 7/5/2022  No central pulmonary embolus identified. Tiny peripheral branches are not well evaluated secondary to motion. Mildly dilated ascending aorta. There is mild fat stranding seen in the upper abdomen, surrounding the spleen. This is partially imaged This is of uncertain significance. This may simply be due to fluid overload. Consider abdomen and pelvis CT with IV contrast for further characterization Trace pleural fluid raising the question of mild fluid overload       Assessment :      Hospital Problems           Last Modified POA    * (Principal) Unstable angina ([address] 7/5/2022 Yes    Coronary artery disease involving native coronary artery of native heart 7/5/2022 Yes    Dyslipidemia 7/5/2022 Yes    Primary hypertension 7/5/2022 Yes          Plan:     Patient status observation in the Cardiovascular ICU    Observation evaluation  Serial cardiac enzymes  Cardiology consultation  [facility]btain records from [facility]  Monitor and control blood pressure  Aspirin and statin  Activity as tolerated, PT OT as needed  Consideration for repeat heart catheterization pending initial work-up  See orders for details    Consultations:   IP CONSULT TO INTERNAL MEDICINE  IP CONSULT TO Santino Arriaga DO  7/5/2022  1:29 PM    Copy sent to Dr. Phyllis Russ primary care provider on file.

## 2022-07-05 NOTE — CARE COORDINATION
Case Management Initial Discharge Plan  George Hickman,         Readmission Risk              Risk of Unplanned Readmission:  0             Met with:patient to discuss discharge plans. Information verified: address, contacts, phone number, , insurance Yes  PCP: No primary care provider on file. Date of last visit: PCP list provided    Insurance Provider: [facility]    Discharge Planning  Current Residence:  Apartment  Living Arrangements:    Sober living  Home has 1 stories/0 stairs to climb  Support Systems:     Current Services PTA:  Sober living Agency:   Patient able to perform ADL's:Independent  DME in home:  none  DME used to aid ambulation prior to admission:   none  DME used during admission:  none    Potential Assistance Needed:       Pharmacy: Will use [facility] pharmacy   Potential Assistance Purchasing Medications:     Does patient want to participate in local refill/ meds to beds program?  Yes    Patient agreeable to home care: no  [place] of choice provided:  n/a      Type of Home Care Services:     Patient expects to be discharged to:       Prior SNF/Rehab Placement and Facility: n/a  Agreeable to SNF/Rehab: No  [place] of choice provided: n/a   Evaluation: no    Expected Discharge date: Follow Up Appointment: Best Day/ Time:      Transportation provider: family  Transportation arrangements needed for discharge: No    Discharge Plan: Chest pain  Patient moved from [place] to [place] about 5 months and lives in a sober living apartment. Yisel Hdz is his contact. PCP list provided. Patient has a cardiac history, including CABG and Pacemaker. Waiting on patient information from [place]. Patient is scheduled for a heart cath tomorrow afternoon. Continue to follow.          Electronically signed by Hebert Goodell, RN on 22 at 2:02 PM EDT

## 2022-07-05 NOTE — ED NOTES
Pt to ED via EMS. Pt c/o intermittent CP all day yesterday. Pt then woke up out of his sleep this morning with continuous CP an 8 on the pain scale. Pt reports pain is midsternal and radiates up his neck. Pt describes pain as stabbing and pressure. Pt reports hx of 3 MI's and 5 stents placed. Pt reports he has a pacemaker in place. Pt reports his cardiologist is in [place] and he just moved to [facility] 5 months ago. Pt diaphoretic upon arrival. Pt appears uncomfortable. 324 ASA and 0.8 nitro given by EMS PTA  Pt A&Ox4.      Ignacio Bond, RN  07/05/22 92 Theresa Wilder RN  07/05/22 4478

## 2022-07-05 NOTE — FLOWSHEET NOTE
07/05/22 1312   Treatment Team Notification   Reason for Communication Evaluate; Review case   Team Member Name [facility] Team Role Attending Provider   Method of Communication Face to face   Response At bedside   Notification Time [phone] at bedside, states pt may eat, wants medical records from [facility] before he does heart cath, wants Echo & PPM interrogated. MD states he will do heart cath tomorrow (7/6) afternoon.

## 2022-07-05 NOTE — ED PROVIDER NOTES
EMERGENCY DEPARTMENT ENCOUNTER    Pt Name: Janas Snellen  MRN: [id number]  [place] 1970  Date of evaluation: 7/5/22  CHIEF COMPLAINT       Chief Complaint   Patient presents with    Chest Pain     HISTORY OF PRESENT ILLNESS   Patient is a 79-year-old male with PMH of CAD, status post MIs, status post stents, pacemaker placed recently, who is brought in by ambulance after waking up with chest pain. Pain centrally located, nonradiating, nonexertional, nonpositional.  No other issues at this time. ROS:  No fevers, cough, shortness of breath, chest pain, abdominal pain, nausea, vomiting, changes in urine or stool. REVIEW OF SYSTEMS     Review of Systems   All other systems reviewed and are negative. PASTMEDICAL HISTORY   History reviewed. No pertinent past medical history. SURGICAL HISTORY     History reviewed. No pertinent surgical history. CURRENT MEDICATIONS       Previous Medications    No medications on file     ALLERGIES     is allergic to morphine. FAMILY HISTORY     has no family status information on file. SOCIAL HISTORY       Social History     Tobacco Use    Smoking status: Never Smoker    Smokeless tobacco: Never Used   Vaping Use    Vaping Use: Every day   Substance Use Topics    Alcohol use: Not Currently    Drug use: Not on file     PHYSICAL EXAM     INITIAL VITALS: /83   Pulse 63   Temp 97.5 °F (36.4 °C) (Oral)   Resp 15   Ht 5' 9\" (1.753 m)   Wt 198 lb (89.8 kg)   SpO2 100%   BMI 29.24 kg/m²    Physical Exam  HENT:      Head: Normocephalic. Right Ear: External ear normal.      Left Ear: External ear normal.      Nose: Nose normal.   Eyes:      Conjunctiva/sclera: Conjunctivae normal.   Cardiovascular:      Rate and Rhythm: Normal rate. Pulmonary:      Effort: Pulmonary effort is normal.   Abdominal:      General: Abdomen is flat. Skin:     General: Skin is dry. Neurological:      Mental Status: He is alert. Mental status is at baseline.    Psychiatric: Mood and Affect: Mood normal.         Behavior: Behavior normal.         MEDICAL DECISION MAKING:   The patient is hemodynamically stable, afebrile, nontoxic-appearing. Physical exam unremarkable. Based on history and exam concerning for ACS   ED plan for basic labs, EKG, serial troponins, chest x-ray, lactic admission. Fluid bolus ordered with a volume of 1000 ml. The 30 ml/kg fluid bolus is not ordered due to concern for fluid overload and/or heart failure. DIAGNOSTIC RESULTS   EKG:All EKG's are interpreted by the Emergency Department Physician who either signs or Co-signs this chart in the absence of a cardiologist.        RADIOLOGY:All plain film, CT, MRI, and formal ultrasound images (except ED bedside ultrasound) are read by the radiologist, see reports below, unless otherwisenoted in MDM or here. CT CHEST PULMONARY EMBOLISM W CONTRAST   Final Result   No central pulmonary embolus identified. Tiny peripheral branches are not   well evaluated secondary to motion. Mildly dilated ascending aorta. There is mild fat stranding seen in the upper abdomen, surrounding the   spleen. This is partially imaged      This is of uncertain significance. This may simply be due to fluid overload. Consider abdomen and pelvis CT with IV contrast for further characterization      Trace pleural fluid raising the question of mild fluid overload         XR CHEST PORTABLE   Final Result   Mild cardiomegaly status post CABG and ICD placement. Otherwise negative   exam.           LABS: All lab results were reviewed by myself, and all abnormals are listed below.   Labs Reviewed   CBC WITH AUTO DIFFERENTIAL - Abnormal; Notable for the following components:       Result Value    RDW 15.4 (*)     All other components within normal limits   COMPREHENSIVE METABOLIC PANEL W/ REFLEX TO MG FOR LOW K - Abnormal; Notable for the following components:    Glucose 105 (*)     ALT 92 (*)     AST 62 (*) Total Protein 6.3 (*)     All other components within normal limits   BRAIN NATRIURETIC PEPTIDE - Abnormal; Notable for the following components:    Pro- (*)     All other components within normal limits   TROPONIN   TROPONIN       EMERGENCY DEPARTMENTCOURSE:   Patient remained stable in the ED    Labs:  Potassium 4.4  Creatinine 0.96    Troponin 14, 13  WBC 7.0  Hemoglobin 14.2  Serial EKGs nonischemic    CTA negative for PE    Patient has multiple risk factors. We will admit for chest pain rule out. Discussed case with Chip Ramires, who accepts patient for admission to hospital.        Vitals:    Vitals:    07/05/22 0330 07/05/22 0346 07/05/22 0600 07/05/22 0616   BP: 113/74 126/64 (!) 138/90 139/83   Pulse: 69 65 72 63   Resp: 20 21 19 15   Temp:       TempSrc:       SpO2: 99% 98% 98% 100%   Weight:       Height:           The patient was given the following medications while in the emergency department:  Orders Placed This Encounter   Medications    0.9 % sodium chloride bolus    fentaNYL (SUBLIMAZE) injection 100 mcg    fentaNYL (SUBLIMAZE) injection 100 mcg    LORazepam (ATIVAN) injection 1 mg    0.9 % sodium chloride bolus    sodium chloride flush 0.9 % injection 10 mL    iopamidol (ISOVUE-370) 76 % injection 75 mL    LORazepam (ATIVAN) injection 1 mg     CONSULTS:  IP CONSULT TO INTERNAL MEDICINE    FINAL IMPRESSION      1. Chest pain, unspecified type          DISPOSITION/PLAN   DISPOSITION Decision To Admit 07/05/2022 06:16:24 AM      PATIENT REFERRED TO:  No follow-up provider specified.   DISCHARGE MEDICATIONS:  New Prescriptions    No medications on file     Irena Oliveros MD  Attending Emergency Physician                    Maura Gresham MD  07/05/22 8908

## 2022-07-05 NOTE — PROGRESS NOTES
Patient transported to room via ED RN on stretcher. Patient placed in bed and put on cardiac monitor. Vital signs obtained. Patient c/o chest pain 7 out of 10, placed on 2L NC and V/s obtained. Patient updated on plan of care and verbalized understanding. All lines and tubes in tact. Will continue to monitor and Physician notified of chest pain.

## 2022-07-05 NOTE — PLAN OF CARE
Problem: Discharge Planning  Goal: Discharge to home or other facility with appropriate resources  7/5/2022 1955 by Deborah Kaur RN  Outcome: Progressing  7/5/2022 1803 by Deborah Kaur RN  Outcome: Progressing  Flowsheets (Taken 7/5/2022 1000)  Discharge to home or other facility with appropriate resources: Identify barriers to discharge with patient and caregiver     Problem: Pain  Goal: Verbalizes/displays adequate comfort level or baseline comfort level  7/5/2022 1955 by Deborah Kaur RN  Outcome: Progressing  7/5/2022 1803 by Deborah Kaur RN  Outcome: Progressing

## 2022-07-05 NOTE — FLOWSHEET NOTE
Pt removing telemetry(3rd) time stating he wants to leave, RN talked with him and decided to stay and requesting extra food.  A vape pen was found under his bed and put in lock box in room along with $3.

## 2022-07-06 ENCOUNTER — HOSPITAL ENCOUNTER (EMERGENCY)
Age: 52
Discharge: HOME | End: 2022-07-06
Attending: EMERGENCY MEDICINE
Payer: MEDICAID

## 2022-07-06 ENCOUNTER — APPOINTMENT (OUTPATIENT)
Dept: GENERAL RADIOLOGY | Age: 52
End: 2022-07-06
Payer: MEDICAID

## 2022-07-06 VITALS
OXYGEN SATURATION: 99 % | TEMPERATURE: 97.9 F | DIASTOLIC BLOOD PRESSURE: 92 MMHG | RESPIRATION RATE: 27 BRPM | HEART RATE: 70 BPM | BODY MASS INDEX: 29.92 KG/M2 | HEIGHT: 69 IN | WEIGHT: 202 LBS | SYSTOLIC BLOOD PRESSURE: 144 MMHG

## 2022-07-06 VITALS
RESPIRATION RATE: 19 BRPM | SYSTOLIC BLOOD PRESSURE: 128 MMHG | HEART RATE: 66 BPM | DIASTOLIC BLOOD PRESSURE: 75 MMHG | HEIGHT: 69 IN | TEMPERATURE: 98.2 F | WEIGHT: 202 LBS | OXYGEN SATURATION: 97 % | BODY MASS INDEX: 29.92 KG/M2

## 2022-07-06 DIAGNOSIS — R07.89 ATYPICAL CHEST PAIN: Primary | ICD-10-CM

## 2022-07-06 LAB
ABSOLUTE EOS #: 0.15 K/UL (ref 0–0.44)
ABSOLUTE IMMATURE GRANULOCYTE: 0.03 K/UL (ref 0–0.3)
ABSOLUTE LYMPH #: 1.12 K/UL (ref 1.1–3.7)
ABSOLUTE MONO #: 0.58 K/UL (ref 0.1–1.2)
ANION GAP SERPL CALCULATED.3IONS-SCNC: 8 MMOL/L (ref 9–17)
ANTI-XA UNFRAC HEPARIN: 0.43 IU/L (ref 0.3–0.7)
BASOPHILS # BLD: 0 % (ref 0–2)
BASOPHILS ABSOLUTE: 0.03 K/UL (ref 0–0.2)
BUN BLDV-MCNC: 11 MG/DL (ref 6–20)
BUN/CREAT BLD: 11 (ref 9–20)
CALCIUM SERPL-MCNC: 8.8 MG/DL (ref 8.6–10.4)
CHLORIDE BLD-SCNC: 100 MMOL/L (ref 98–107)
CO2: 28 MMOL/L (ref 20–31)
CREAT SERPL-MCNC: 1 MG/DL (ref 0.7–1.2)
EOSINOPHILS RELATIVE PERCENT: 2 % (ref 1–4)
GFR AFRICAN AMERICAN: >60 ML/MIN
GFR NON-AFRICAN AMERICAN: >60 ML/MIN
GFR SERPL CREATININE-BSD FRML MDRD: ABNORMAL ML/MIN/{1.73_M2}
GLUCOSE BLD-MCNC: 96 MG/DL (ref 70–99)
HCT VFR BLD CALC: 41.8 % (ref 40.7–50.3)
HEMOGLOBIN: 13.8 G/DL (ref 13–17)
IMMATURE GRANULOCYTES: 0 %
LYMPHOCYTES # BLD: 16 % (ref 24–43)
MCH RBC QN AUTO: 30.7 PG (ref 25.2–33.5)
MCHC RBC AUTO-ENTMCNC: 33 G/DL (ref 28.4–34.8)
MCV RBC AUTO: 93.1 FL (ref 82.6–102.9)
MONOCYTES # BLD: 8 % (ref 3–12)
NRBC AUTOMATED: 0 PER 100 WBC
PDW BLD-RTO: 15.1 % (ref 11.8–14.4)
PLATELET # BLD: 200 K/UL (ref 138–453)
PMV BLD AUTO: 9.5 FL (ref 8.1–13.5)
POTASSIUM SERPL-SCNC: 4.2 MMOL/L (ref 3.7–5.3)
PRO-BNP: 433 PG/ML
RBC # BLD: 4.49 M/UL (ref 4.21–5.77)
RBC # BLD: ABNORMAL 10*6/UL
REASON FOR REJECTION: NORMAL
SEG NEUTROPHILS: 73 % (ref 36–65)
SEGMENTED NEUTROPHILS ABSOLUTE COUNT: 5.21 K/UL (ref 1.5–8.1)
SODIUM BLD-SCNC: 136 MMOL/L (ref 135–144)
TROPONIN, HIGH SENSITIVITY: 11 NG/L (ref 0–22)
TROPONIN, HIGH SENSITIVITY: 13 NG/L (ref 0–22)
WBC # BLD: 7.1 K/UL (ref 3.5–11.3)
ZZ NTE CLEAN UP: ORDERED TEST: NORMAL
ZZ NTE WITH NAME CLEAN UP: SPECIMEN SOURCE: NORMAL

## 2022-07-06 PROCEDURE — 85025 COMPLETE CBC W/AUTO DIFF WBC: CPT

## 2022-07-06 PROCEDURE — 2500000003 HC RX 250 WO HCPCS: Performed by: INTERNAL MEDICINE

## 2022-07-06 PROCEDURE — G0378 HOSPITAL OBSERVATION PER HR: HCPCS

## 2022-07-06 PROCEDURE — 93005 ELECTROCARDIOGRAM TRACING: CPT | Performed by: INTERNAL MEDICINE

## 2022-07-06 PROCEDURE — 2700000000 HC OXYGEN THERAPY PER DAY

## 2022-07-06 PROCEDURE — 6370000000 HC RX 637 (ALT 250 FOR IP): Performed by: INTERNAL MEDICINE

## 2022-07-06 PROCEDURE — 83880 ASSAY OF NATRIURETIC PEPTIDE: CPT

## 2022-07-06 PROCEDURE — 36415 COLL VENOUS BLD VENIPUNCTURE: CPT

## 2022-07-06 PROCEDURE — 80048 BASIC METABOLIC PNL TOTAL CA: CPT

## 2022-07-06 PROCEDURE — 93005 ELECTROCARDIOGRAM TRACING: CPT | Performed by: EMERGENCY MEDICINE

## 2022-07-06 PROCEDURE — 96376 TX/PRO/DX INJ SAME DRUG ADON: CPT

## 2022-07-06 PROCEDURE — 94761 N-INVAS EAR/PLS OXIMETRY MLT: CPT

## 2022-07-06 PROCEDURE — 85520 HEPARIN ASSAY: CPT

## 2022-07-06 PROCEDURE — 84484 ASSAY OF TROPONIN QUANT: CPT

## 2022-07-06 PROCEDURE — 6360000002 HC RX W HCPCS: Performed by: INTERNAL MEDICINE

## 2022-07-06 PROCEDURE — 96366 THER/PROPH/DIAG IV INF ADDON: CPT

## 2022-07-06 PROCEDURE — 71045 X-RAY EXAM CHEST 1 VIEW: CPT

## 2022-07-06 PROCEDURE — 6370000000 HC RX 637 (ALT 250 FOR IP): Performed by: EMERGENCY MEDICINE

## 2022-07-06 PROCEDURE — 6360000002 HC RX W HCPCS: Performed by: EMERGENCY MEDICINE

## 2022-07-06 RX ORDER — ISOSORBIDE MONONITRATE 30 MG/1
30 TABLET, EXTENDED RELEASE ORAL DAILY
Qty: 30 TABLET | Refills: 3 | Status: ON HOLD | OUTPATIENT
Start: 2022-07-06 | End: 2022-07-14 | Stop reason: HOSPADM

## 2022-07-06 RX ORDER — ASPIRIN 81 MG/1
324 TABLET, CHEWABLE ORAL ONCE
Status: COMPLETED | OUTPATIENT
Start: 2022-07-06 | End: 2022-07-06

## 2022-07-06 RX ORDER — FENTANYL CITRATE 50 UG/ML
50 INJECTION, SOLUTION INTRAMUSCULAR; INTRAVENOUS ONCE
Status: COMPLETED | OUTPATIENT
Start: 2022-07-06 | End: 2022-07-06

## 2022-07-06 RX ORDER — METOPROLOL SUCCINATE 25 MG/1
25 TABLET, EXTENDED RELEASE ORAL DAILY
Qty: 30 TABLET | Refills: 0 | Status: SHIPPED | OUTPATIENT
Start: 2022-07-06

## 2022-07-06 RX ADMIN — LORAZEPAM 1 MG: 2 INJECTION INTRAMUSCULAR at 04:14

## 2022-07-06 RX ADMIN — ACETAMINOPHEN 650 MG: 325 TABLET ORAL at 04:15

## 2022-07-06 RX ADMIN — ASPIRIN 81 MG CHEWABLE TABLET 324 MG: 81 TABLET CHEWABLE at 11:05

## 2022-07-06 RX ADMIN — HEPARIN SODIUM AND DEXTROSE 15.14 UNITS/KG/HR: 10000; 5 INJECTION INTRAVENOUS at 01:38

## 2022-07-06 RX ADMIN — NITROGLYCERIN 100 MCG/MIN: 20 INJECTION INTRAVENOUS at 01:40

## 2022-07-06 RX ADMIN — HEPARIN SODIUM AND DEXTROSE 15.14 UNITS/KG/HR: 10000; 5 INJECTION INTRAVENOUS at 00:00

## 2022-07-06 RX ADMIN — HYDROMORPHONE HYDROCHLORIDE 0.5 MG: 1 INJECTION, SOLUTION INTRAMUSCULAR; INTRAVENOUS; SUBCUTANEOUS at 04:37

## 2022-07-06 RX ADMIN — FENTANYL CITRATE 50 MCG: 50 INJECTION, SOLUTION INTRAMUSCULAR; INTRAVENOUS at 13:17

## 2022-07-06 RX ADMIN — HEPARIN SODIUM 2000 UNITS: 1000 INJECTION INTRAVENOUS; SUBCUTANEOUS at 00:00

## 2022-07-06 ASSESSMENT — PAIN DESCRIPTION - PAIN TYPE
TYPE: CHRONIC PAIN

## 2022-07-06 ASSESSMENT — PAIN DESCRIPTION - LOCATION
LOCATION: CHEST
LOCATION: HEAD;CHEST
LOCATION: CHEST
LOCATION: CHEST
LOCATION: HEAD
LOCATION: CHEST

## 2022-07-06 ASSESSMENT — ENCOUNTER SYMPTOMS
VOMITING: 0
BACK PAIN: 0
SHORTNESS OF BREATH: 1
NAUSEA: 0

## 2022-07-06 ASSESSMENT — PAIN DESCRIPTION - DESCRIPTORS
DESCRIPTORS: SHARP
DESCRIPTORS: ACHING
DESCRIPTORS: SHARP

## 2022-07-06 ASSESSMENT — PAIN SCALES - GENERAL
PAINLEVEL_OUTOF10: 4
PAINLEVEL_OUTOF10: 5
PAINLEVEL_OUTOF10: 4
PAINLEVEL_OUTOF10: 8
PAINLEVEL_OUTOF10: 4
PAINLEVEL_OUTOF10: 8
PAINLEVEL_OUTOF10: 7
PAINLEVEL_OUTOF10: 8
PAINLEVEL_OUTOF10: 5
PAINLEVEL_OUTOF10: 8
PAINLEVEL_OUTOF10: 5

## 2022-07-06 ASSESSMENT — PAIN - FUNCTIONAL ASSESSMENT: PAIN_FUNCTIONAL_ASSESSMENT: 0-10

## 2022-07-06 NOTE — PROGRESS NOTES
Pt continues to refuse to wear his Oxygen. Nasal canula removed in attempts to decrease agitation.   Pulse ox on RA 95%

## 2022-07-06 NOTE — ED PROVIDER NOTES
Taking? Authorizing Provider   isosorbide mononitrate (IMDUR) 30 MG extended release tablet Take 1 tablet by mouth daily 7/6/22  Yes Sara Tabares, DO   metoprolol succinate (TOPROL XL) 25 MG extended release tablet Take 1 tablet by mouth daily 7/6/22  Yes Sara Tabares, DO   ASPIRIN LOW DOSE 81 MG chewable tablet Take 81 mg by mouth daily 6/17/22   Historical Provider, MD   atorvastatin (LIPITOR) 80 MG tablet Take 80 mg by mouth nightly 6/17/22   Historical Provider, MD   buPROPion (WELLBUTRIN) 100 MG tablet Take 100 mg by mouth in the morning, at noon, and at bedtime 6/3/22   Historical Provider, MD   cloNIDine (CATAPRES) 0.1 MG tablet Take 0.1 mg by mouth in the morning, at noon, and at bedtime 6/3/22   Historical Provider, MD   lisinopril (PRINIVIL;ZESTRIL) 2.5 MG tablet Take 2.5 mg by mouth daily 6/17/22   Historical Provider, MD   spironolactone (ALDACTONE) 25 MG tablet Take 25 mg by mouth daily 6/17/22   Historical Provider, MD   diazePAM (VALIUM PO) Take 10 mg by mouth 2 times daily    Historical Provider, MD       REVIEW OF SYSTEMS    (2-9 systems for level 4, 10 or more for level 5)    Review of Systems   Constitutional: Positive for fatigue. Respiratory: Positive for shortness of breath. Cardiovascular: Positive for chest pain. Gastrointestinal: Negative for nausea and vomiting. Genitourinary: Negative for flank pain. Musculoskeletal: Negative for back pain. Neurological: Positive for weakness. Negative for dizziness, syncope and light-headedness. Hematological: Does not bruise/bleed easily. PHYSICAL EXAM   (up to 7 for level 4, 8 or more for level 5)    VITALS:   Vitals:    07/06/22 1012 07/06/22 1120 07/06/22 1239 07/06/22 1403   BP: (!) 154/90  (!) 144/92    Pulse: 59 62 75 70   Resp: 16 24 (!) 33 27   Temp: 97.9 °F (36.6 °C)      SpO2: 99%  99%    Weight: 202 lb (91.6 kg)      Height: 5' 9\" (1.753 m)          Physical Exam  Vitals and nursing note reviewed.    Constitutional: Abnormal; Notable for the following components:       Result Value    RDW 15.1 (*)     Seg Neutrophils 73 (*)     Lymphocytes 16 (*)     All other components within normal limits   BASIC METABOLIC PANEL - Abnormal; Notable for the following components:    Anion Gap 8 (*)     All other components within normal limits   BRAIN NATRIURETIC PEPTIDE - Abnormal; Notable for the following components:    Pro- (*)     All other components within normal limits   TROPONIN   TROPONIN       RADIOLOGY:  XR CHEST 1 VIEW    Result Date: 7/6/2022  EXAMINATION: ONE XRAY VIEW OF THE CHEST 7/6/2022 11:12 am COMPARISON: 07/05/2022 HISTORY: ORDERING SYSTEM PROVIDED HISTORY: chest pain TECHNOLOGIST PROVIDED HISTORY: chest pain Reason for Exam: SOB and anxiety x 1 week FINDINGS: Stable AICD left chest.  Previous CABG. Cardiomegaly. Normal pulmonary vasculature. No focal consolidation, pleural effusion, or pneumothorax. No evidence of acute process. EKG    EKG Interpretation    Interpreted by emergency department physician    Rhythm: paced  Rate: 64  Axis: normal  Ectopy: premature ventricular contractions (infrequent)  Conduction: normal  ST Segments: depression in  II, III and aVf  T Waves: no acute change  Q Waves: none    Clinical Impression: non-specific EKG    All EKG's are interpreted by the Emergency Department Physician whoeither signs or Co-signs this chart in the absence of a cardiologist.    EMERGENCY DEPARTMENT COURSE:  ED Course as of 07/06/22 1450   Wed Jul 06, 2022   1059 Limited 2D echo 7/5/22:  Left ventricle is moderately enlarged. Global left ventricular systolic function is severely reduced with an  estimated ejection fraction of 20 % . Grade I (mild) left ventricular diastolic dysfunction. Severe global hypokinesis with distal anteroseptal and apical akinesis  Pacemaker / ICD lead seen in right ventricle. Aortic leaflets show calcification without restriction of motion.   Mild aortic insufficiency. Mild mitral regurgitation. Mild tricuspid regurgitation. PASP 32mmHg  No significant pericardial effusion is seen. [AO]   1100 Cath on 6/15/22 done at [facility]:  Conclusion:   1. Multivessel obstructive coronary disease.     2. Patent lima to the left anterior descending artery and patent saphenous   vein graft to the obtuse marginal.     3. Severely depressed left ventricular systolic function.     4. Dilated ascending aorta without evidence of dissection. Recommendations:   1. Medical therapy as needed.     2. Risk factor modification.     3. Tobacco cessation      [AO]   1122 CBC with Auto Differential(!):    WBC 7.1   RBC 4.49   Hemoglobin Quant 13.8   Hematocrit 41.8   MCV 93.1   MCH 30.7   MCHC 33.0   RDW 15.1(!)   Platelet Count 928   MPV 9.5   NRBC Automated 0.0   RBC Morphology ANISOCYTOSIS PRESENT   Seg Neutrophils 73(!)   Lymphocytes 16(!)   Monocytes 8   Eosinophils % 2   Basophils 0   Immature Granulocytes 0   Segs Absolute 5.21   Absolute Lymph # 1.12   Absolute Mono # 0.58   Absolute Eos # 0.15   Basophils Absolute 0.03   Absolute Immature Granulocyte 0.03 [AO]   4442 Basic Metabolic Panel(!):    GLUCOSE, FASTING,GF 96   BUN,BUNPL 11   Creatinine 1.00   Bun/Cre Ratio 11   CALCIUM, SERUM, 908564 8.8   Sodium 136   Potassium 4.2   Chloride 100   CO2 28   Anion Gap 8(!)   GFR Non- >60   GFR  >60   GFR Comment      [AO]   1134 Troponin:    Troponin, High Sensitivity 13 [AO]   1134 Brain Natriuretic Peptide(!):    Pro-(!) [AO]   1155 XR CHEST 1 VIEW [AO]   1216 Speaking to Dr. Saqib Perkins, recommending speaking to [facility] since they cathed him most recently after STEMI when he was admitted back in June  [AO]   1238 Pt c/o CP and SOB after going outside to smoke a cigarette  [AO]   1325 [facility] cards-->2nd trop less than 20 no need for admission can f/u outpatient  [AO]   1341 Troponin, High Sensitivity: 11 [AO]   1346 Pt updated. Requesting narcotic pain meds for home. Has addiction hx. Reporting still having CP, SOB, fatigue x2 weeks. Will contact [facility] again  [AO]   [phone] On the phone with cards again  [AO]   1410 Imdur 30mg daily and appointment with office  [AO]      ED Course User Index  [AO] Dennis Pastor 1721, DO       MDM  Number of Diagnoses or Management Options  Atypical chest pain  Diagnosis management comments: 79-year-old male history of polysubstance abuse, cardiomyopathy with known coronary artery disease status post AICD/pacemaker placement who is originally from [place] moved to [facility] to get away from his old lifestyle and is currently in a rehab facility presents emergency department with chest pain. Patient was actually admitted from the emergency department yesterday to the floor and cardiology saw them in house today want to do a cardiac cath, however patient was unhappy with his care and signed out AMA and then returned back to the emergency department. He was seen at [facility] back in the middle of June after he had a STEMI and had echo and cath at that time. I spoke to our on-call cardiology who requested I speak to [facility] cardiology due to patient having more established invasive care with them as well as 1 follow-up office. Spoke to [facility] cardiology, reviewed case. Stated that if patient second troponin was downtrending and not above 20 that there is no need to readmit him to the hospital as he recently just had all this diagnostic testing. Patient's second troponin is downtrending. EKG unchanged compared to prior. Chest x-ray unremarkable. Other labs unremarkable. However patient states that he has been feeling unwell for the past 2 weeks and is requesting narcotic pain medication for his chest pain for when he goes home. Explained to him because of his history of polysubstance abuse that did not want to give him anything that can potentially be abused or addictive.   He states he did not have history of problem with opioids. I stated I did not want to give him an issue with opioids. I contacted [address] [place] [address] cardiology again stating the patient was still having persistent chest pain. Reviewed his case again went over his home medications. Requested I start him on Imdur 30 mg daily. Patient requesting refills on his metoprolol as well as Wellbutrin. I feel comfortable giving him the metoprolol but not the Wellbutrin as this needs to be followed by a primary care provider or psychiatrist.  Patient discharged home in stable condition, the  at his rehab facility was also made aware of the plan and his medical records and discharge summary were faxed over per request.       Amount and/or Complexity of Data Reviewed  Clinical lab tests: ordered and reviewed  Tests in the radiology section of CPT®: ordered and reviewed  Review and summarize past medical records: yes  Discuss the patient with other providers: yes  Independent visualization of images, tracings, or specimens: yes    Patient Progress  Patient progress: stable      PROCEDURES:  Procedures     CONSULTS:  IP CONSULT TO CARDIOLOGY  IP CONSULT TO CARDIOLOGY    CRITICAL CARE:  NONE    FINAL IMPRESSION     1. Atypical chest pain       DISPOSITION / PLAN   DISPOSITION Decision To Discharge 07/06/2022 02:17:59 PM      Evaluation and treatment course in the ED, and plan of care upon discharge was discussed in length with the patient. Patient had no further questions prior to being discharged and was instructed to return to the ED for new or worsening symptoms. Any changes to existing medications or new prescriptions were reviewed with patient and they expressed understanding of how to correctly take their medications and the possible side effects.     PATIENT REFERRED TO:  [facility] ED  [address]  [phone]    As needed, If symptoms worsen    [address] [place] [address] [facility]  [address] [facility] [facility]  [place], [place] [address] [phone]          DISCHARGE MEDICATIONS:  New Prescriptions    ISOSORBIDE MONONITRATE (IMDUR) 30 MG EXTENDED RELEASE TABLET    Take 1 tablet by mouth daily       Sara La,   Emergency Medicine Physician    (Please note that portions of this note were completed with a voice recognition program.  Efforts were made to edit the dictations but occasionally words are mis-transcribed.)        Dennis Pastor 1729,   07/06/22 3836

## 2022-07-06 NOTE — PROGRESS NOTES
Pt continues to get out of bed. Pt reoriented to time place and situation and reminded to use his call light. Pt continues to be argumentative with staff.

## 2022-07-06 NOTE — FLOWSHEET NOTE
[facility]  PROGRESS NOTE    Room # 2027/2027-01   Name: Curt Leonard              Reason for visit: Routine    I visited the patient. Admit Date & Time: 7/5/2022  2:24 AM    Assessment:  Curt Leonard is a 45 y.o. male. Upon entering the room patient was sleeping. Intervention:   provided a ministry presence and brief prayer. Outcome:  Patient did not respond. Plan:  Chaplains will remain available to offer spiritual and emotional support as needed. Electronically signed by Aliya Malcolm.  Chaplain Matheus, on 7/5/2022 at [address]       07/05/22 2010   Encounter Summary   Service Provided For: Patient   Referral/Consult From: Marlene   Last Encounter  07/05/22   Complexity of Encounter Low   Begin Time 1650   End Time  1652   Total Time Calculated 2 min   Encounter    Type Initial Screen/Assessment   Assessment/Intervention/Outcome   Assessment Unable to assess   Intervention Prayer (assurance of)/[place];Sustaining Presence/Ministry of presence

## 2022-07-06 NOTE — PLAN OF CARE
Problem: Discharge Planning  Goal: Discharge to home or other facility with appropriate resources  7/6/2022 0242 by Darya Lewis RN  Outcome: Not Progressing  7/6/2022 0241 by Darya Lewis RN  Outcome: Not Progressing  7/6/2022 0239 by Darya Lewis RN  Outcome: Not Progressing  7/5/2022 1955 by Elías Weiss RN  Outcome: Progressing  7/5/2022 1803 by Elías Weiss RN  Outcome: Progressing  Flowsheets (Taken 7/5/2022 1000)  Discharge to home or other facility with appropriate resources: Identify barriers to discharge with patient and caregiver     Problem: Chronic Conditions and Co-morbidities  Goal: Patient's chronic conditions and co-morbidity symptoms are monitored and maintained or improved  7/6/2022 0242 by Darya Lewis RN  Outcome: Not Progressing  7/6/2022 0241 by Darya Lewis RN  Outcome: Not Progressing     Problem: Safety - Adult  Goal: Free from fall injury  7/6/2022 0242 by Darya Lewis RN  Outcome: Not Progressing  7/6/2022 0241 by Darya Lewis RN  Outcome: Not Progressing

## 2022-07-06 NOTE — PROGRESS NOTES
Pt again up out of bed. This time he is disconnecting monitoring equipment and IV tubing. Pt asked to stop and what he needed that he was up out of bed again without using his call light. Pt stated \"I'm going to take a shit, get the fuck out of y room. \"  Nursing Supervisor notified about repeating issue with pt not following directions. Supervisor at bedside to talk with pt.

## 2022-07-06 NOTE — SIGNIFICANT EVENT
I was contacted by Dr. Mis Casey about this gentleman. He presented to the emergency department today with chest discomfort. In reviewing his chart, it appears he was admitted to the hospital on July 5, 2022. At that time he reported chest discomfort. He was seen by Dr. Christi Turner in cardiology consultation on July 5, 2022. I was able to review the note from this encounter. Notably he was recently seen in our office and had also undergone a cardiac catheterization on Gerda 15, 2022. This was performed by my partner Dr. Shadi Aguilera. In reviewing the note from Dr. Christi Turner,  There was a comment about repeating ischemic workup after reviewing recent cardiac testing. Apparently the patient then left against medical advice but return to the emergency department with chest discomfort once again. This is when I was contacted by Dr. Mis Casey. He has had high sensitivity troponins of 13 and 11 on serial measurements. I personally reviewed the images from his cardiac catheterization performed on Gerda 15, 2022. This revealed occlusions of the left anterior descending coronary, left circumflex coronary artery and right coronary artery. The left anterior descending coronary was noted to fill via a patent left internal mammary artery graft in the left circumflex coronary was noted to fill via a patent saphenous vein graft. The right coronary artery was  Either not grafted or had an occluded graft which could not be identified. The right coronary artery did fill via contralateral collaterals. At this point given the repeat high sensitivity troponin values, the patient does not have an acute coronary syndrome. I suspect he may have some degree of chronic stable angina. He also has a known history of chronic systolic congestive heart failure. There is no indication for repeat cardiac catheterization. Additionally stress testing would provide no additional useful information for this gentleman.       I have advised that the patient may be placed on isosorbide mononitrate at a dose of 30 mg daily. He can then follow-up in our office in the next week or so to assess his response to the above in terms of symptoms and consider other potential changes to his medical regimen.   Long-term, he may benefit from evaluation of percutaneous revascularization of his right coronary artery; however, his medical regimen needs to be optimized and he may also require viability testing 1st.

## 2022-07-06 NOTE — PROGRESS NOTES
Pt up out of bed attempting to take the bedside monitor apart. When asked not to mess with hospital equipment pt stated \"I know what I'm doing, I know how this works\". Pt was explained that hospital equipment is not for patients to mess with to please respect the rules and not touch the monitor. Pt stated \"I'll do what I damn want, go get me a cup of coffee\". Nursing supervisor called to have security come talk with pt. Nursing supervisor at bedside to attempt to calm pt.

## 2022-07-06 NOTE — PROGRESS NOTES
Pt up out of bed refusing to use call light. Pt asked why he was up out of bed. Pt sts that he needs to pee. Pt aske to use call light for his safety. Pt stated \"i'll do what I want. \"  Pt assisted with urinating safely and placed back in bed.

## 2022-07-06 NOTE — DISCHARGE SUMMARY
[facility]  Office: [address], DO, Surendra Bassy, DO, Clinton Base, DO, Nelson Dailey Blood, DO, Dolores Hopkins MD, Lety Cha MD, Héctor Keita MD, Huong Andrade MD, Jevon Salazar MD, Nanette Chauhan MD, Mackenzie Walden MD, Kirill Fair, DO, Ignacia Jacobo MD,  Guillaume Singh, DO, Mariajose Guzman MD, Janet Dao MD, Andreas Braden, DO, Patricio Dc MD, Ney Haywood MD, Katie Hurt, DO, Darshana Rubio MD, Kandy Briceño MD, Ancelmo Bass, [facility], [facility], [facility], Miki Kumar, CNP, Joanie Feldman, CNP, Gabe Caicedo, [facility], Dia Contreras, CNP, Carmelita Morris PA-C, Denver Parks, [facility], Kayla Camarillo, CNP, Marc Sanders, CNP, Vamshi Landrum, CNP, Germain Law, CNS, Med Ramirez, [facility], Mundo Redding, CNP, Dony Orlando, [facility], Carlitos Durant, [facility]    Discharge Summary     Patient ID: Arabella Nur  :  1970   MRN: [id number]     ACCOUNT:  [de-identified]   Patient's PCP: No primary care provider on file. Admit Date: 2022   Discharge Date: 2022     Length of Stay: 0  Code Status:  Full Code  Admitting Physician: Caio Oliver MD  Discharge Physician: Regan Winkler DO     Active Discharge Diagnoses:     Hospital Problem Lists:  Principal Problem:    Unstable angina [facility])  Active Problems:    Coronary artery disease involving native coronary artery of native heart    Dyslipidemia    Primary hypertension  Resolved Problems:    * No resolved hospital problems. *      Admission Condition:  poor     Discharged Condition: fair    Hospital Stay:     Hospital Course:  Arabella Nur is a 45 y.o. male who was admitted for the management of  Unstable angina ([facility] [address] , presented to ER with Chest Pain    This is a 79-year-old male with a history of coronary disease that presents with a complaint of chest pain this is with unstable angina.   Is admitted to the hospital underwent routine serial cardiac enzymes and cardiology consultation. Ultimately he signed out [address] with his first return if he had recurrent symptoms. Significant therapeutic interventions: Above    Significant Diagnostic Studies:   Labs / Micro:  CBC:   Lab Results   Component Value Date/Time    WBC 6.9 07/12/2022 04:54 AM    RBC 4.43 07/12/2022 04:54 AM    HGB 13.5 07/12/2022 04:54 AM    HCT 42.0 07/12/2022 04:54 AM    MCV 94.8 07/12/2022 04:54 AM    MCH 30.5 07/12/2022 04:54 AM    MCHC 32.1 07/12/2022 04:54 AM    RDW 15.1 07/12/2022 04:54 AM     07/12/2022 04:54 AM     BMP:    Lab Results   Component Value Date/Time    GLUCOSE 108 07/14/2022 04:18 AM     07/14/2022 04:18 AM    K 4.2 07/14/2022 04:18 AM    CL 98 07/14/2022 04:18 AM    CO2 29 07/14/2022 04:18 AM    ANIONGAP 9 07/14/2022 04:18 AM    BUN 18 07/14/2022 04:18 AM    CREATININE 1.30 07/14/2022 04:18 AM    BUNCRER 14 07/14/2022 04:18 AM    CALCIUM 8.9 07/14/2022 04:18 AM    LABGLOM 58 07/14/2022 04:18 AM    GFRAA >60 07/14/2022 04:18 AM    GFR      07/14/2022 04:18 AM        Radiology:  XR CHEST PORTABLE    Result Date: 7/5/2022  Mild cardiomegaly status post CABG and ICD placement. Otherwise negative exam.     CT CHEST PULMONARY EMBOLISM W CONTRAST    Result Date: 7/5/2022  No central pulmonary embolus identified. Tiny peripheral branches are not well evaluated secondary to motion. Mildly dilated ascending aorta. There is mild fat stranding seen in the upper abdomen, surrounding the spleen. This is partially imaged This is of uncertain significance. This may simply be due to fluid overload.  Consider abdomen and pelvis CT with IV contrast for further characterization Trace pleural fluid raising the question of mild fluid overload       Consultations:    Consults:     Final Specialist Recommendations/Findings:   IP CONSULT TO INTERNAL MEDICINE  IP CONSULT TO CARDIOLOGY  IP CONSULT TO SOCIAL WORK      The patient was seen and examined on day of discharge and this discharge summary is in conjunction with any daily progress note from day of discharge. Discharge plan:     Disposition: AMA    Physician Follow Up:   PCP 1 week  No follow-up provider specified. Requiring Further Evaluation/Follow Up POST HOSPITALIZATION/Incidental Findings: none    Diet: cardiac diet    Activity: As tolerated    Instructions to Patient: Return if worsening symptoms    Discharge Medications:      Medication List        CONTINUE taking these medications      Aspirin Low Dose 81 MG chewable tablet  Generic drug: aspirin     atorvastatin 80 MG tablet  Commonly known as: LIPITOR     buPROPion 100 MG tablet  Commonly known as: WELLBUTRIN     cloNIDine 0.1 MG tablet  Commonly known as: CATAPRES     lisinopril 2.5 MG tablet  Commonly known as: PRINIVIL;ZESTRIL     metoprolol succinate 25 MG extended release tablet  Commonly known as: TOPROL XL     spironolactone 25 MG tablet  Commonly known as: ALDACTONE     VALIUM PO              No discharge procedures on file. Time Spent on discharge is  20 mins in patient examination, evaluation, counseling as well as medication reconciliation, prescriptions for required medications, discharge plan and follow up. Electronically signed by   Syd Villafuerte DO  7/6/2022  8:15 AM      Thank you Dr. Kristie Preston primary care provider on file. for the opportunity to be involved in this patient's care.

## 2022-07-06 NOTE — PROGRESS NOTES
Security at bedside to talk to pt. Pt stating that he wants to leave. Pt was given Dilaudid and ativan. Pt is unable to leave without a responsible party.

## 2022-07-06 NOTE — PROGRESS NOTES
Pt is up out of bed demanding coffee and to allowed to do as he pleases. Pt explained that he is not allowed to have anything to eat or drink due to his scheduled procedure later this morning. Pt stated that he wants to leave. MUNIR Couch notified.

## 2022-07-06 NOTE — PROGRESS NOTES
Pt bed alarm going off. Pt found up out of bed in room. Pt had taken off his ecg leads, BP cuff and disconnected his pulse ox. Pt was changing his clothes c/o of being cold. Pt refusing to wear hospital gown. Pt stated \"I know what I'm doing, I've done this a thousand times damn it\". Pt told that he needed to calm down and follow directions for his safety. Pt again educated on side effects of his RX anxiety/CIWA meds he has been getting. Pt explained that for his safety he needs to use his call light for help not just get out of bed. Pt continues to be very resistant to staff directions. Pt back in bed hooked back up to monitoring equipment. Bed alarm reactivated. Pt given snack.

## 2022-07-06 NOTE — PROGRESS NOTES
Pt up in room walking around. Pt asked for his belongings. Pt taking off all hospital monitoring equipment and refusing to cooperate with staff. NP Jon at bedside to talk with pt.   Pt agreeable to cooperate and wait for cardiologist to see him this am.

## 2022-07-06 NOTE — PROGRESS NOTES
PT unable to secure ride with responsible party. Pt demanded to have his IV's out or he was going to rip them out. Pt IV's removed dressings applied. Pt encouraged to stay due to medications given. Pt still refusing to stay and demanding to leave. Security at bedside attempting to encourage pt to stay. Pt escorted out with security present. Pt left without any further incident. Pd notified.

## 2022-07-06 NOTE — PROGRESS NOTES
Spoke with MUNIR Story about pt not following directions, continually getting out of bed, disconnecting equipment and IV's. Also discussed with MUNIR Story safety concerns with further medicating pt with narcotics and benzo's.

## 2022-07-06 NOTE — ED NOTES
Updated Rehab facility of Pts upcoming d/c and plan for follow. Facility requesting d/c papers faxed to facility. Pt agreeable to have medical information shared.       Anna Cutler RN  07/06/22 3251

## 2022-07-07 LAB
EKG ATRIAL RATE: 58 BPM
EKG ATRIAL RATE: 66 BPM
EKG ATRIAL RATE: 67 BPM
EKG ATRIAL RATE: 77 BPM
EKG P AXIS: 59 DEGREES
EKG P AXIS: 65 DEGREES
EKG P AXIS: 68 DEGREES
EKG P-R INTERVAL: 146 MS
EKG P-R INTERVAL: 152 MS
EKG P-R INTERVAL: 158 MS
EKG Q-T INTERVAL: 400 MS
EKG Q-T INTERVAL: 410 MS
EKG Q-T INTERVAL: 418 MS
EKG Q-T INTERVAL: 480 MS
EKG QRS DURATION: 114 MS
EKG QRS DURATION: 126 MS
EKG QRS DURATION: 136 MS
EKG QRS DURATION: 98 MS
EKG QTC CALCULATION (BAZETT): 419 MS
EKG QTC CALCULATION (BAZETT): 433 MS
EKG QTC CALCULATION (BAZETT): 473 MS
EKG QTC CALCULATION (BAZETT): 514 MS
EKG R AXIS: 102 DEGREES
EKG R AXIS: 84 DEGREES
EKG R AXIS: 91 DEGREES
EKG R AXIS: 95 DEGREES
EKG T AXIS: -101 DEGREES
EKG T AXIS: -17 DEGREES
EKG T AXIS: -54 DEGREES
EKG T AXIS: 115 DEGREES
EKG VENTRICULAR RATE: 66 BPM
EKG VENTRICULAR RATE: 67 BPM
EKG VENTRICULAR RATE: 69 BPM
EKG VENTRICULAR RATE: 77 BPM

## 2022-07-10 ENCOUNTER — APPOINTMENT (OUTPATIENT)
Dept: CT IMAGING | Age: 52
DRG: 198 | End: 2022-07-10
Payer: MEDICAID

## 2022-07-10 ENCOUNTER — HOSPITAL ENCOUNTER (INPATIENT)
Age: 52
LOS: 4 days | Discharge: HOME | DRG: 198 | End: 2022-07-14
Attending: STUDENT IN AN ORGANIZED HEALTH CARE EDUCATION/TRAINING PROGRAM | Admitting: INTERNAL MEDICINE
Payer: MEDICAID

## 2022-07-10 ENCOUNTER — APPOINTMENT (OUTPATIENT)
Dept: GENERAL RADIOLOGY | Age: 52
DRG: 198 | End: 2022-07-10
Payer: MEDICAID

## 2022-07-10 DIAGNOSIS — I25.118 CORONARY ARTERY DISEASE OF NATIVE ARTERY OF NATIVE HEART WITH STABLE ANGINA PECTORIS (HCC): ICD-10-CM

## 2022-07-10 DIAGNOSIS — F41.9 ANXIETY: ICD-10-CM

## 2022-07-10 DIAGNOSIS — I20.0 UNSTABLE ANGINA (HCC): Primary | ICD-10-CM

## 2022-07-10 DIAGNOSIS — I73.9 CLAUDICATION OF BOTH LOWER EXTREMITIES (HCC): ICD-10-CM

## 2022-07-10 DIAGNOSIS — I71.21 ASCENDING AORTIC ANEURYSM: ICD-10-CM

## 2022-07-10 LAB
-: NORMAL
ABSOLUTE EOS #: 0.24 K/UL (ref 0–0.44)
ABSOLUTE IMMATURE GRANULOCYTE: 0.04 K/UL (ref 0–0.3)
ABSOLUTE LYMPH #: 2.63 K/UL (ref 1.1–3.7)
ABSOLUTE MONO #: 0.63 K/UL (ref 0.1–1.2)
AMPHETAMINE SCREEN URINE: NEGATIVE
ANION GAP SERPL CALCULATED.3IONS-SCNC: 12 MMOL/L (ref 9–17)
BARBITURATE SCREEN URINE: NEGATIVE
BASOPHILS # BLD: 1 % (ref 0–2)
BASOPHILS ABSOLUTE: 0.06 K/UL (ref 0–0.2)
BENZODIAZEPINE SCREEN, URINE: NEGATIVE
BILIRUBIN URINE: NEGATIVE
BUN BLDV-MCNC: 16 MG/DL (ref 6–20)
BUN/CREAT BLD: 13 (ref 9–20)
CALCIUM SERPL-MCNC: 9.4 MG/DL (ref 8.6–10.4)
CANNABINOID SCREEN URINE: NEGATIVE
CHLORIDE BLD-SCNC: 102 MMOL/L (ref 98–107)
CO2: 24 MMOL/L (ref 20–31)
COCAINE METABOLITE, URINE: NEGATIVE
COLOR: YELLOW
CREAT SERPL-MCNC: 1.2 MG/DL (ref 0.7–1.2)
EOSINOPHILS RELATIVE PERCENT: 2 % (ref 1–4)
EPITHELIAL CELLS UA: NORMAL /HPF (ref 0–5)
ETHANOL PERCENT: <0.01 %
ETHANOL: <10 MG/DL
GFR AFRICAN AMERICAN: >60 ML/MIN
GFR NON-AFRICAN AMERICAN: >60 ML/MIN
GFR SERPL CREATININE-BSD FRML MDRD: ABNORMAL ML/MIN/{1.73_M2}
GLUCOSE BLD-MCNC: 110 MG/DL (ref 70–99)
GLUCOSE URINE: NEGATIVE
HCT VFR BLD CALC: 46.6 % (ref 40.7–50.3)
HEMOGLOBIN: 15.8 G/DL (ref 13–17)
IMMATURE GRANULOCYTES: 0 %
KETONES, URINE: NEGATIVE
LEUKOCYTE ESTERASE, URINE: NEGATIVE
LYMPHOCYTES # BLD: 26 % (ref 24–43)
MCH RBC QN AUTO: 30.7 PG (ref 25.2–33.5)
MCHC RBC AUTO-ENTMCNC: 33.9 G/DL (ref 28.4–34.8)
MCV RBC AUTO: 90.7 FL (ref 82.6–102.9)
METHADONE SCREEN, URINE: NEGATIVE
MONOCYTES # BLD: 6 % (ref 3–12)
NITRITE, URINE: NEGATIVE
NRBC AUTOMATED: 0 PER 100 WBC
OPIATES, URINE: NEGATIVE
OXYCODONE SCREEN URINE: NEGATIVE
PDW BLD-RTO: 14.7 % (ref 11.8–14.4)
PH UA: 6.5 (ref 5–8)
PHENCYCLIDINE, URINE: NEGATIVE
PLATELET # BLD: 276 K/UL (ref 138–453)
PMV BLD AUTO: 9.4 FL (ref 8.1–13.5)
POTASSIUM SERPL-SCNC: 3.9 MMOL/L (ref 3.7–5.3)
PRO-BNP: 497 PG/ML
PROTEIN UA: NEGATIVE
RBC # BLD: 5.14 M/UL (ref 4.21–5.77)
RBC # BLD: ABNORMAL 10*6/UL
RBC UA: NORMAL /HPF (ref 0–2)
SEG NEUTROPHILS: 64 % (ref 36–65)
SEGMENTED NEUTROPHILS ABSOLUTE COUNT: 6.47 K/UL (ref 1.5–8.1)
SODIUM BLD-SCNC: 138 MMOL/L (ref 135–144)
SPECIFIC GRAVITY UA: 1.02 (ref 1–1.03)
TEST INFORMATION: NORMAL
TROPONIN, HIGH SENSITIVITY: 15 NG/L (ref 0–22)
TROPONIN, HIGH SENSITIVITY: 16 NG/L (ref 0–22)
TURBIDITY: CLEAR
URINE HGB: NEGATIVE
UROBILINOGEN, URINE: NORMAL
WBC # BLD: 10.1 K/UL (ref 3.5–11.3)
WBC UA: NORMAL /HPF (ref 0–5)

## 2022-07-10 PROCEDURE — 84484 ASSAY OF TROPONIN QUANT: CPT

## 2022-07-10 PROCEDURE — 2580000003 HC RX 258: Performed by: STUDENT IN AN ORGANIZED HEALTH CARE EDUCATION/TRAINING PROGRAM

## 2022-07-10 PROCEDURE — 2060000000 HC ICU INTERMEDIATE R&B

## 2022-07-10 PROCEDURE — 80307 DRUG TEST PRSMV CHEM ANLYZR: CPT

## 2022-07-10 PROCEDURE — 74174 CTA ABD&PLVS W/CONTRAST: CPT

## 2022-07-10 PROCEDURE — 81001 URINALYSIS AUTO W/SCOPE: CPT

## 2022-07-10 PROCEDURE — G0480 DRUG TEST DEF 1-7 CLASSES: HCPCS

## 2022-07-10 PROCEDURE — 93005 ELECTROCARDIOGRAM TRACING: CPT | Performed by: NURSE PRACTITIONER

## 2022-07-10 PROCEDURE — 96361 HYDRATE IV INFUSION ADD-ON: CPT

## 2022-07-10 PROCEDURE — G0378 HOSPITAL OBSERVATION PER HR: HCPCS

## 2022-07-10 PROCEDURE — 80048 BASIC METABOLIC PNL TOTAL CA: CPT

## 2022-07-10 PROCEDURE — 96376 TX/PRO/DX INJ SAME DRUG ADON: CPT

## 2022-07-10 PROCEDURE — 6370000000 HC RX 637 (ALT 250 FOR IP): Performed by: STUDENT IN AN ORGANIZED HEALTH CARE EDUCATION/TRAINING PROGRAM

## 2022-07-10 PROCEDURE — 93005 ELECTROCARDIOGRAM TRACING: CPT | Performed by: STUDENT IN AN ORGANIZED HEALTH CARE EDUCATION/TRAINING PROGRAM

## 2022-07-10 PROCEDURE — 71045 X-RAY EXAM CHEST 1 VIEW: CPT

## 2022-07-10 PROCEDURE — 83880 ASSAY OF NATRIURETIC PEPTIDE: CPT

## 2022-07-10 PROCEDURE — 96375 TX/PRO/DX INJ NEW DRUG ADDON: CPT

## 2022-07-10 PROCEDURE — 6360000002 HC RX W HCPCS: Performed by: STUDENT IN AN ORGANIZED HEALTH CARE EDUCATION/TRAINING PROGRAM

## 2022-07-10 PROCEDURE — 85025 COMPLETE CBC W/AUTO DIFF WBC: CPT

## 2022-07-10 PROCEDURE — 96374 THER/PROPH/DIAG INJ IV PUSH: CPT

## 2022-07-10 PROCEDURE — 2000000000 HC ICU R&B

## 2022-07-10 PROCEDURE — 99285 EMERGENCY DEPT VISIT HI MDM: CPT

## 2022-07-10 PROCEDURE — 6360000004 HC RX CONTRAST MEDICATION: Performed by: STUDENT IN AN ORGANIZED HEALTH CARE EDUCATION/TRAINING PROGRAM

## 2022-07-10 RX ORDER — ASPIRIN 81 MG/1
324 TABLET, CHEWABLE ORAL ONCE
Status: COMPLETED | OUTPATIENT
Start: 2022-07-10 | End: 2022-07-10

## 2022-07-10 RX ORDER — SODIUM CHLORIDE 0.9 % (FLUSH) 0.9 %
10 SYRINGE (ML) INJECTION ONCE
Status: COMPLETED | OUTPATIENT
Start: 2022-07-10 | End: 2022-07-10

## 2022-07-10 RX ORDER — 0.9 % SODIUM CHLORIDE 0.9 %
80 INTRAVENOUS SOLUTION INTRAVENOUS ONCE
Status: COMPLETED | OUTPATIENT
Start: 2022-07-10 | End: 2022-07-10

## 2022-07-10 RX ORDER — ONDANSETRON 2 MG/ML
4 INJECTION INTRAMUSCULAR; INTRAVENOUS ONCE
Status: COMPLETED | OUTPATIENT
Start: 2022-07-10 | End: 2022-07-10

## 2022-07-10 RX ORDER — ASPIRIN 81 MG/1
162 TABLET, CHEWABLE ORAL ONCE
Status: DISCONTINUED | OUTPATIENT
Start: 2022-07-10 | End: 2022-07-10

## 2022-07-10 RX ORDER — FENTANYL CITRATE 50 UG/ML
100 INJECTION, SOLUTION INTRAMUSCULAR; INTRAVENOUS ONCE
Status: COMPLETED | OUTPATIENT
Start: 2022-07-10 | End: 2022-07-10

## 2022-07-10 RX ADMIN — ONDANSETRON 4 MG: 2 INJECTION INTRAMUSCULAR; INTRAVENOUS at 19:49

## 2022-07-10 RX ADMIN — ASPIRIN 81 MG CHEWABLE TABLET 324 MG: 81 TABLET CHEWABLE at 20:30

## 2022-07-10 RX ADMIN — SODIUM CHLORIDE 80 ML: 9 INJECTION, SOLUTION INTRAVENOUS at 22:04

## 2022-07-10 RX ADMIN — IOPAMIDOL 75 ML: 755 INJECTION, SOLUTION INTRAVENOUS at 22:03

## 2022-07-10 RX ADMIN — HYDROMORPHONE HYDROCHLORIDE 0.5 MG: 1 INJECTION, SOLUTION INTRAMUSCULAR; INTRAVENOUS; SUBCUTANEOUS at 21:11

## 2022-07-10 RX ADMIN — HYDROMORPHONE HYDROCHLORIDE 0.5 MG: 1 INJECTION, SOLUTION INTRAMUSCULAR; INTRAVENOUS; SUBCUTANEOUS at 23:45

## 2022-07-10 RX ADMIN — SODIUM CHLORIDE, PRESERVATIVE FREE 10 ML: 5 INJECTION INTRAVENOUS at 22:03

## 2022-07-10 RX ADMIN — FENTANYL CITRATE 100 MCG: 50 INJECTION, SOLUTION INTRAMUSCULAR; INTRAVENOUS at 19:49

## 2022-07-10 ASSESSMENT — PAIN SCALES - GENERAL
PAINLEVEL_OUTOF10: 10
PAINLEVEL_OUTOF10: 7
PAINLEVEL_OUTOF10: 8

## 2022-07-11 ENCOUNTER — APPOINTMENT (OUTPATIENT)
Dept: GENERAL RADIOLOGY | Age: 52
DRG: 198 | End: 2022-07-11
Payer: MEDICAID

## 2022-07-11 PROBLEM — I47.20 VT (VENTRICULAR TACHYCARDIA): Status: ACTIVE | Noted: 2019-09-30

## 2022-07-11 PROBLEM — I49.3 PVC (PREMATURE VENTRICULAR CONTRACTION): Status: ACTIVE | Noted: 2022-06-28

## 2022-07-11 PROBLEM — F43.10 POST TRAUMATIC STRESS DISORDER: Status: ACTIVE | Noted: 2020-05-04

## 2022-07-11 PROBLEM — I20.0 UNSTABLE ANGINA (HCC): Status: ACTIVE | Noted: 2022-07-11

## 2022-07-11 PROBLEM — F60.9 PERSONALITY DISORDER (HCC): Status: ACTIVE | Noted: 2020-05-04

## 2022-07-11 PROBLEM — Z95.810 PRESENCE OF IMPLANTABLE CARDIOVERTER-DEFIBRILLATOR (ICD): Status: ACTIVE | Noted: 2022-06-28

## 2022-07-11 PROBLEM — F41.9 ANXIETY: Status: ACTIVE | Noted: 2022-07-11

## 2022-07-11 PROBLEM — I50.42 CHRONIC COMBINED SYSTOLIC AND DIASTOLIC HEART FAILURE (HCC): Status: ACTIVE | Noted: 2022-06-28

## 2022-07-11 PROBLEM — I73.9 CLAUDICATION OF BOTH LOWER EXTREMITIES (HCC): Status: ACTIVE | Noted: 2022-07-11

## 2022-07-11 PROBLEM — I21.9 MYOCARDIAL INFARCTION (HCC): Status: ACTIVE | Noted: 2022-06-15

## 2022-07-11 PROBLEM — F25.9 SCHIZOAFFECTIVE DISORDER (HCC): Status: ACTIVE | Noted: 2019-12-06

## 2022-07-11 LAB
EKG ATRIAL RATE: 65 BPM
EKG ATRIAL RATE: 65 BPM
EKG ATRIAL RATE: 89 BPM
EKG ATRIAL RATE: 89 BPM
EKG P AXIS: 30 DEGREES
EKG P AXIS: 48 DEGREES
EKG P AXIS: 62 DEGREES
EKG P AXIS: 64 DEGREES
EKG P-R INTERVAL: 138 MS
EKG P-R INTERVAL: 140 MS
EKG P-R INTERVAL: 184 MS
EKG P-R INTERVAL: 186 MS
EKG Q-T INTERVAL: 340 MS
EKG Q-T INTERVAL: 356 MS
EKG Q-T INTERVAL: 426 MS
EKG Q-T INTERVAL: 444 MS
EKG QRS DURATION: 102 MS
EKG QRS DURATION: 106 MS
EKG QRS DURATION: 96 MS
EKG QRS DURATION: 98 MS
EKG QTC CALCULATION (BAZETT): 413 MS
EKG QTC CALCULATION (BAZETT): 433 MS
EKG QTC CALCULATION (BAZETT): 443 MS
EKG QTC CALCULATION (BAZETT): 461 MS
EKG R AXIS: 78 DEGREES
EKG R AXIS: 86 DEGREES
EKG R AXIS: 91 DEGREES
EKG R AXIS: 96 DEGREES
EKG T AXIS: -140 DEGREES
EKG T AXIS: 121 DEGREES
EKG T AXIS: 61 DEGREES
EKG T AXIS: 72 DEGREES
EKG VENTRICULAR RATE: 65 BPM
EKG VENTRICULAR RATE: 65 BPM
EKG VENTRICULAR RATE: 89 BPM
EKG VENTRICULAR RATE: 89 BPM
HCT VFR BLD CALC: 42.7 % (ref 40.7–50.3)
HEMOGLOBIN: 14.3 G/DL (ref 13–17)
MAGNESIUM: 2 MG/DL (ref 1.6–2.6)
MCH RBC QN AUTO: 31.2 PG (ref 25.2–33.5)
MCHC RBC AUTO-ENTMCNC: 33.5 G/DL (ref 28.4–34.8)
MCV RBC AUTO: 93 FL (ref 82.6–102.9)
NRBC AUTOMATED: 0 PER 100 WBC
PDW BLD-RTO: 15.1 % (ref 11.8–14.4)
PLATELET # BLD: 249 K/UL (ref 138–453)
PMV BLD AUTO: 9.5 FL (ref 8.1–13.5)
POTASSIUM SERPL-SCNC: 4.2 MMOL/L (ref 3.7–5.3)
RBC # BLD: 4.59 M/UL (ref 4.21–5.77)
TROPONIN, HIGH SENSITIVITY: 17 NG/L (ref 0–22)
WBC # BLD: 8.8 K/UL (ref 3.5–11.3)

## 2022-07-11 PROCEDURE — 84484 ASSAY OF TROPONIN QUANT: CPT

## 2022-07-11 PROCEDURE — 93005 ELECTROCARDIOGRAM TRACING: CPT | Performed by: INTERNAL MEDICINE

## 2022-07-11 PROCEDURE — 2580000003 HC RX 258: Performed by: NURSE PRACTITIONER

## 2022-07-11 PROCEDURE — 6370000000 HC RX 637 (ALT 250 FOR IP): Performed by: NURSE PRACTITIONER

## 2022-07-11 PROCEDURE — 85027 COMPLETE CBC AUTOMATED: CPT

## 2022-07-11 PROCEDURE — 6370000000 HC RX 637 (ALT 250 FOR IP): Performed by: INTERNAL MEDICINE

## 2022-07-11 PROCEDURE — 99223 1ST HOSP IP/OBS HIGH 75: CPT | Performed by: INTERNAL MEDICINE

## 2022-07-11 PROCEDURE — G0378 HOSPITAL OBSERVATION PER HR: HCPCS

## 2022-07-11 PROCEDURE — 72100 X-RAY EXAM L-S SPINE 2/3 VWS: CPT

## 2022-07-11 PROCEDURE — APPSS45 APP SPLIT SHARED TIME 31-45 MINUTES: Performed by: NURSE PRACTITIONER

## 2022-07-11 PROCEDURE — 96372 THER/PROPH/DIAG INJ SC/IM: CPT

## 2022-07-11 PROCEDURE — 93005 ELECTROCARDIOGRAM TRACING: CPT | Performed by: STUDENT IN AN ORGANIZED HEALTH CARE EDUCATION/TRAINING PROGRAM

## 2022-07-11 PROCEDURE — 93923 UPR/LXTR ART STDY 3+ LVLS: CPT

## 2022-07-11 PROCEDURE — 84132 ASSAY OF SERUM POTASSIUM: CPT

## 2022-07-11 PROCEDURE — 6360000002 HC RX W HCPCS: Performed by: NURSE PRACTITIONER

## 2022-07-11 PROCEDURE — 2060000000 HC ICU INTERMEDIATE R&B

## 2022-07-11 PROCEDURE — 36415 COLL VENOUS BLD VENIPUNCTURE: CPT

## 2022-07-11 PROCEDURE — 72040 X-RAY EXAM NECK SPINE 2-3 VW: CPT

## 2022-07-11 PROCEDURE — 83735 ASSAY OF MAGNESIUM: CPT

## 2022-07-11 PROCEDURE — 96376 TX/PRO/DX INJ SAME DRUG ADON: CPT

## 2022-07-11 RX ORDER — LISINOPRIL 2.5 MG/1
2.5 TABLET ORAL DAILY
Status: DISCONTINUED | OUTPATIENT
Start: 2022-07-11 | End: 2022-07-14 | Stop reason: HOSPADM

## 2022-07-11 RX ORDER — BUPROPION HYDROCHLORIDE 100 MG/1
100 TABLET ORAL 3 TIMES DAILY
Status: DISCONTINUED | OUTPATIENT
Start: 2022-07-11 | End: 2022-07-14 | Stop reason: HOSPADM

## 2022-07-11 RX ORDER — ISOSORBIDE MONONITRATE 30 MG/1
30 TABLET, EXTENDED RELEASE ORAL ONCE
Status: COMPLETED | OUTPATIENT
Start: 2022-07-11 | End: 2022-07-11

## 2022-07-11 RX ORDER — SODIUM CHLORIDE 0.9 % (FLUSH) 0.9 %
5-40 SYRINGE (ML) INJECTION EVERY 12 HOURS SCHEDULED
Status: DISCONTINUED | OUTPATIENT
Start: 2022-07-11 | End: 2022-07-14 | Stop reason: HOSPADM

## 2022-07-11 RX ORDER — METOPROLOL SUCCINATE 25 MG/1
25 TABLET, EXTENDED RELEASE ORAL DAILY
Status: DISCONTINUED | OUTPATIENT
Start: 2022-07-11 | End: 2022-07-13

## 2022-07-11 RX ORDER — CLONIDINE HYDROCHLORIDE 0.1 MG/1
0.1 TABLET ORAL 3 TIMES DAILY
Status: DISCONTINUED | OUTPATIENT
Start: 2022-07-11 | End: 2022-07-13

## 2022-07-11 RX ORDER — ACETAMINOPHEN 325 MG/1
650 TABLET ORAL EVERY 6 HOURS PRN
Status: DISCONTINUED | OUTPATIENT
Start: 2022-07-11 | End: 2022-07-14 | Stop reason: HOSPADM

## 2022-07-11 RX ORDER — ONDANSETRON 2 MG/ML
4 INJECTION INTRAMUSCULAR; INTRAVENOUS EVERY 6 HOURS PRN
Status: DISCONTINUED | OUTPATIENT
Start: 2022-07-11 | End: 2022-07-14 | Stop reason: HOSPADM

## 2022-07-11 RX ORDER — ONDANSETRON 4 MG/1
4 TABLET, ORALLY DISINTEGRATING ORAL EVERY 8 HOURS PRN
Status: DISCONTINUED | OUTPATIENT
Start: 2022-07-11 | End: 2022-07-14 | Stop reason: HOSPADM

## 2022-07-11 RX ORDER — ATORVASTATIN CALCIUM 80 MG/1
80 TABLET, FILM COATED ORAL NIGHTLY
Status: DISCONTINUED | OUTPATIENT
Start: 2022-07-11 | End: 2022-07-11 | Stop reason: SDUPTHER

## 2022-07-11 RX ORDER — NITROGLYCERIN 0.4 MG/1
0.4 TABLET SUBLINGUAL EVERY 5 MIN PRN
Status: DISCONTINUED | OUTPATIENT
Start: 2022-07-11 | End: 2022-07-14 | Stop reason: HOSPADM

## 2022-07-11 RX ORDER — SPIRONOLACTONE 25 MG/1
25 TABLET ORAL DAILY
Status: DISCONTINUED | OUTPATIENT
Start: 2022-07-11 | End: 2022-07-14 | Stop reason: HOSPADM

## 2022-07-11 RX ORDER — POLYETHYLENE GLYCOL 3350 17 G/17G
17 POWDER, FOR SOLUTION ORAL DAILY PRN
Status: DISCONTINUED | OUTPATIENT
Start: 2022-07-11 | End: 2022-07-14 | Stop reason: HOSPADM

## 2022-07-11 RX ORDER — SODIUM CHLORIDE 0.9 % (FLUSH) 0.9 %
5-40 SYRINGE (ML) INJECTION PRN
Status: DISCONTINUED | OUTPATIENT
Start: 2022-07-11 | End: 2022-07-14 | Stop reason: HOSPADM

## 2022-07-11 RX ORDER — SODIUM CHLORIDE 9 MG/ML
INJECTION, SOLUTION INTRAVENOUS PRN
Status: DISCONTINUED | OUTPATIENT
Start: 2022-07-11 | End: 2022-07-14 | Stop reason: HOSPADM

## 2022-07-11 RX ORDER — DIAZEPAM 5 MG/1
5 TABLET ORAL EVERY 6 HOURS PRN
Status: DISCONTINUED | OUTPATIENT
Start: 2022-07-11 | End: 2022-07-14 | Stop reason: HOSPADM

## 2022-07-11 RX ORDER — ACETAMINOPHEN 650 MG/1
650 SUPPOSITORY RECTAL EVERY 6 HOURS PRN
Status: DISCONTINUED | OUTPATIENT
Start: 2022-07-11 | End: 2022-07-14 | Stop reason: HOSPADM

## 2022-07-11 RX ORDER — ENOXAPARIN SODIUM 100 MG/ML
40 INJECTION SUBCUTANEOUS DAILY
Status: DISCONTINUED | OUTPATIENT
Start: 2022-07-11 | End: 2022-07-14 | Stop reason: HOSPADM

## 2022-07-11 RX ORDER — ISOSORBIDE MONONITRATE 30 MG/1
60 TABLET, EXTENDED RELEASE ORAL DAILY
Status: DISCONTINUED | OUTPATIENT
Start: 2022-07-12 | End: 2022-07-12

## 2022-07-11 RX ORDER — ATORVASTATIN CALCIUM 80 MG/1
80 TABLET, FILM COATED ORAL NIGHTLY
Status: DISCONTINUED | OUTPATIENT
Start: 2022-07-11 | End: 2022-07-14 | Stop reason: HOSPADM

## 2022-07-11 RX ORDER — ISOSORBIDE MONONITRATE 30 MG/1
30 TABLET, EXTENDED RELEASE ORAL DAILY
Status: DISCONTINUED | OUTPATIENT
Start: 2022-07-11 | End: 2022-07-11

## 2022-07-11 RX ORDER — ASPIRIN 81 MG/1
81 TABLET, CHEWABLE ORAL DAILY
Status: DISCONTINUED | OUTPATIENT
Start: 2022-07-11 | End: 2022-07-14 | Stop reason: HOSPADM

## 2022-07-11 RX ADMIN — CLONIDINE HYDROCHLORIDE 0.1 MG: 0.1 TABLET ORAL at 20:39

## 2022-07-11 RX ADMIN — HYDROMORPHONE HYDROCHLORIDE 0.5 MG: 1 INJECTION, SOLUTION INTRAMUSCULAR; INTRAVENOUS; SUBCUTANEOUS at 20:36

## 2022-07-11 RX ADMIN — ATORVASTATIN CALCIUM 80 MG: 80 TABLET, FILM COATED ORAL at 02:01

## 2022-07-11 RX ADMIN — SODIUM CHLORIDE, PRESERVATIVE FREE 10 ML: 5 INJECTION INTRAVENOUS at 07:41

## 2022-07-11 RX ADMIN — HYDROMORPHONE HYDROCHLORIDE 0.5 MG: 1 INJECTION, SOLUTION INTRAMUSCULAR; INTRAVENOUS; SUBCUTANEOUS at 01:26

## 2022-07-11 RX ADMIN — Medication 0.4 MG: at 14:16

## 2022-07-11 RX ADMIN — ISOSORBIDE MONONITRATE 30 MG: 30 TABLET, EXTENDED RELEASE ORAL at 07:42

## 2022-07-11 RX ADMIN — BUPROPION HYDROCHLORIDE 100 MG: 100 TABLET, FILM COATED ORAL at 20:38

## 2022-07-11 RX ADMIN — Medication 0.4 MG: at 14:11

## 2022-07-11 RX ADMIN — ACETAMINOPHEN 650 MG: 325 TABLET ORAL at 14:28

## 2022-07-11 RX ADMIN — HYDROMORPHONE HYDROCHLORIDE 0.5 MG: 1 INJECTION, SOLUTION INTRAMUSCULAR; INTRAVENOUS; SUBCUTANEOUS at 12:15

## 2022-07-11 RX ADMIN — DIAZEPAM 5 MG: 5 TABLET ORAL at 07:43

## 2022-07-11 RX ADMIN — ASPIRIN 81 MG CHEWABLE TABLET 81 MG: 81 TABLET CHEWABLE at 07:43

## 2022-07-11 RX ADMIN — HYDROMORPHONE HYDROCHLORIDE 0.5 MG: 1 INJECTION, SOLUTION INTRAMUSCULAR; INTRAVENOUS; SUBCUTANEOUS at 06:05

## 2022-07-11 RX ADMIN — CLONIDINE HYDROCHLORIDE 0.1 MG: 0.1 TABLET ORAL at 07:42

## 2022-07-11 RX ADMIN — HYDROMORPHONE HYDROCHLORIDE 0.5 MG: 1 INJECTION, SOLUTION INTRAMUSCULAR; INTRAVENOUS; SUBCUTANEOUS at 16:35

## 2022-07-11 RX ADMIN — ONDANSETRON 4 MG: 2 INJECTION INTRAMUSCULAR; INTRAVENOUS at 04:21

## 2022-07-11 RX ADMIN — ISOSORBIDE MONONITRATE 30 MG: 30 TABLET, EXTENDED RELEASE ORAL at 15:32

## 2022-07-11 RX ADMIN — BUPROPION HYDROCHLORIDE 100 MG: 100 TABLET, FILM COATED ORAL at 14:27

## 2022-07-11 RX ADMIN — BUPROPION HYDROCHLORIDE 100 MG: 100 TABLET, FILM COATED ORAL at 09:20

## 2022-07-11 RX ADMIN — SPIRONOLACTONE 25 MG: 25 TABLET ORAL at 07:43

## 2022-07-11 RX ADMIN — DIAZEPAM 5 MG: 5 TABLET ORAL at 14:27

## 2022-07-11 RX ADMIN — DIAZEPAM 5 MG: 5 TABLET ORAL at 20:41

## 2022-07-11 RX ADMIN — METOPROLOL SUCCINATE 25 MG: 25 TABLET, EXTENDED RELEASE ORAL at 07:42

## 2022-07-11 RX ADMIN — SODIUM CHLORIDE, PRESERVATIVE FREE 10 ML: 5 INJECTION INTRAVENOUS at 20:41

## 2022-07-11 RX ADMIN — Medication 0.4 MG: at 14:21

## 2022-07-11 RX ADMIN — ATORVASTATIN CALCIUM 80 MG: 80 TABLET, FILM COATED ORAL at 20:38

## 2022-07-11 RX ADMIN — ENOXAPARIN SODIUM 40 MG: 100 INJECTION SUBCUTANEOUS at 07:42

## 2022-07-11 RX ADMIN — DIAZEPAM 5 MG: 5 TABLET ORAL at 01:47

## 2022-07-11 ASSESSMENT — ENCOUNTER SYMPTOMS
EYE DISCHARGE: 0
EYE REDNESS: 0
BACK PAIN: 0
ABDOMINAL DISTENTION: 0
ABDOMINAL PAIN: 0
COLOR CHANGE: 0
DIARRHEA: 1
VOMITING: 1
ABDOMINAL PAIN: 1
SHORTNESS OF BREATH: 1
NAUSEA: 1
EYES NEGATIVE: 1
COUGH: 1

## 2022-07-11 ASSESSMENT — PAIN DESCRIPTION - LOCATION
LOCATION: CHEST
LOCATION: CHEST;NECK
LOCATION: HEAD
LOCATION: CHEST
LOCATION: CHEST;NECK

## 2022-07-11 ASSESSMENT — PAIN DESCRIPTION - ORIENTATION
ORIENTATION: MID

## 2022-07-11 ASSESSMENT — PAIN DESCRIPTION - DESCRIPTORS
DESCRIPTORS: ACHING;STABBING;SHARP
DESCRIPTORS: HEAVINESS
DESCRIPTORS: HEAVINESS
DESCRIPTORS: ACHING
DESCRIPTORS: HEAVINESS
DESCRIPTORS: STABBING;ACHING

## 2022-07-11 ASSESSMENT — PAIN SCALES - GENERAL
PAINLEVEL_OUTOF10: 9
PAINLEVEL_OUTOF10: 6
PAINLEVEL_OUTOF10: 6
PAINLEVEL_OUTOF10: 5
PAINLEVEL_OUTOF10: 8
PAINLEVEL_OUTOF10: 8
PAINLEVEL_OUTOF10: 6
PAINLEVEL_OUTOF10: 6
PAINLEVEL_OUTOF10: 8
PAINLEVEL_OUTOF10: 6
PAINLEVEL_OUTOF10: 2
PAINLEVEL_OUTOF10: 9
PAINLEVEL_OUTOF10: 5
PAINLEVEL_OUTOF10: 2
PAINLEVEL_OUTOF10: 5
PAINLEVEL_OUTOF10: 5
PAINLEVEL_OUTOF10: 7
PAINLEVEL_OUTOF10: 8
PAINLEVEL_OUTOF10: 6

## 2022-07-11 ASSESSMENT — PAIN - FUNCTIONAL ASSESSMENT
PAIN_FUNCTIONAL_ASSESSMENT: ACTIVITIES ARE NOT PREVENTED
PAIN_FUNCTIONAL_ASSESSMENT: ACTIVITIES ARE NOT PREVENTED

## 2022-07-11 NOTE — CONSULTS
VASCULAR SURGERY H&P      Subjective:    Mr. Marika Rogers is a 45year old male with a past medical history that includes STEMI with LIMA-LAD and GSV to OM, ischemic cardiomyopathy, atrial fibrillation with RVR, CHF and AICD placement who recently left the hospital against medical advice while awaiting repeat cardiac catheterization. He returned on this occasion due to recurrence of chest pain with radiation into the arm that began the morning of his presentation and awoke him from sleep. He is more agreeable to further investigation at this point. Our service was contated due to reported lower extremity ambulatory pain consistent with claudication. This improves with rest, though he also reports \"chest pain, trouble breathing, sweating. Trish Karst Trish Karst \" as his primary functional limitations with regard to activity. Mr. Marika Rogers also describes significant lower extremity swelling and concurrent throbbing discomfort for which he is intermittently cooperative with compression garments. He underwent CT angiography of the chest, abdomen and pelvis which demonstrated focal atherosclerotic areas affecting the descending aorta and iliac arterial segments, along with the takeoffs of the celiac trunk and SMA. He denies postprandial abdominal pain or food avoidance. Additionally, a 5.4 cm ascending aortic aneurysm was noted. Mr. Cady Baumann seemed aware of this and states that surgery has been recommended in the past, though he was also told that he is a poor surgical candidate due to his cardiac function.     Objective:      Past Medical History  Past Medical History:   Diagnosis Date    AA (alcohol abuse)     Coronary artery disease involving native coronary artery of native heart     Drug abuse ([facility] [address] 07/05/2022 7/5 pt reports clean for 5 mos-took cocaine    Dyslipidemia     Hepatitis     Primary hypertension         Past Surgical History   Past Surgical History:   Procedure Laterality Date    CARDIAC CATHETERIZATION      history of 5 stents  CARDIAC DEFIBRILLATOR PLACEMENT      NLT SPINE    CORONARY ARTERY BYPASS GRAFT      2 vessel        Social History  Social History     Socioeconomic History    Marital status: Single     Spouse name: Not on file    Number of children: Not on file    Years of education: Not on file    Highest education level: Not on file   Occupational History    Not on file   Tobacco Use    Smoking status: Never Smoker    Smokeless tobacco: Never Used   Vaping Use    Vaping Use: Every day   Substance and Sexual Activity    Alcohol use: Not Currently    Drug use: Not on file    Sexual activity: Not on file   Other Topics Concern    Not on file   Social History Narrative    Not on file     Social Determinants of Health     Financial Resource Strain:     Difficulty of Paying Living Expenses: Not on file   Food Insecurity:     Worried About Running Out of Food in the Last Year: Not on file    Jonna of Food in the Last Year: Not on file   Transportation Needs:     Lack of Transportation (Medical): Not on file    Lack of Transportation (Non-Medical):  Not on file   Physical Activity:     Days of Exercise per Week: Not on file    Minutes of Exercise per Session: Not on file   Stress:     Feeling of Stress : Not on file   Social Connections:     Frequency of Communication with Friends and Family: Not on file    Frequency of Social Gatherings with Friends and Family: Not on file    Attends Holiness Services: Not on file    Active Member of Clubs or Organizations: Not on file    Attends Club or Organization Meetings: Not on file    Marital Status: Not on file   Intimate Partner Violence:     Fear of Current or Ex-Partner: Not on file    Emotionally Abused: Not on file    Physically Abused: Not on file    Sexually Abused: Not on file   Housing Stability:     Unable to Pay for Housing in the Last Year: Not on file    Number of [place] in the Last Year: Not on file    Unstable Housing in the aneurysmal distension of ascending thoracic aorta measuring   5.4 x 4.8 cm in comparison to the normal diameter of the descending thoracic   aorta measuring 2.5 x 2.6 cm.       Lines and tubes:  None.       Lungs and Airways and Pleura:  Central airways are patent. No lung   consolidation. No pleural effusion. No pneumothorax.       Lymph nodes: No pathologically enlarged mediastinal, hilar, lower cervical,   or chest wall lymph nodes.       Cardiovascular and Mediastinum: No acute aortic pathology.  Cardiac pacemaker   leads are noted.  Coronary arterial calcification and stents are noted. Cardiac chamber sizes appear to measure within normal limits on this non ECG   gated study. No pericardial effusion. The thyroid gland is unremarkable. The   esophagus is unremarkable.       Bones/Soft tissues: No fracture.  No definite suspicious lytic or blastic   foci.  status post sternotomy.       CT abdomen and pelvis:       CT angiogram: No dissection. No intimal injury. No hemodynamically   significant stenosis. Aorta and the origin of major arteries are widely   patent.  There are scattered areas of atherosclerotic calcification within   the aorta, not resulting in hemodynamically significant stenosis. Daphane Flies are   scattered areas of atherosclerotic calcification within the internal iliac   arterial branches resulting in focal areas of mild to moderate stenosis.       Organs:  The liver, spleen, adrenal glands, gallbladder, pancreas, kidneys and   ureters and pelvic organs including the urinary bladder appear unremarkable.       Peritoneum / Retroperitoneum:  No free air or free fluid is noted.  No   pathologically enlarged lymphadenopathy.  The vasculature do not demonstrate   acute abnormality.           GI Tract:  No distention or wall thickening.       Bones and Soft Tissues: No fracture.  No concerning lytic or sclerotic   lesions are noted.           Impression   Aneurysmal distension of ascending thoracic aorta measuring 5.4 x 4.8 cm in   comparison to the normal diameter of the descending thoracic aorta measuring   2.5 x 2.6 cm.       No acute pathology within the chest abdomen and pelvis.  No dissection.  No   acute intimal injury.  No pulmonary embolism.       There are scattered areas of atherosclerotic calcification within the   internal iliac arterial branches resulting in focal areas of mild to moderate   stenosis.       RECOMMENDATIONS:   Unavailable          I/O  I/O last 3 completed shifts:  In: -   Out: 700 [Urine:700]  I/O this shift: In: 480 [P.O.:480]  Out: -       Vitals  Vitals:    07/11/22 1213   BP: 99/69   Pulse: 64   Resp: 13   Temp: 97.9 °F (36.6 °C)   SpO2: 97%          Physical Examination    General: Alert and oriented X 4, pleasant and appropriate in conversation. Eyes: PERRL, EOMI, no scleral icterus or conjunctivitis  HENT: No facial droop noted, no bleeding or discharge from the ears or nares. CV: RRR at this time. Radial: 2+ bilaterally   Popliteal: 2+ bilaterally   Dorsalis Pedis: 2+ bilaterally   Posterior Tibial: 2+ bilaterally  Respiratory: No respiratory distress, effort normal  Abdominal: Normoactive bowel sounds, no abdominal bruit noted. Soft, NTND abdomen with no pulsatile masses or peritoneal signs. Extremities: Able to move all extremities. No edema at this time. Skin: No pallor, cyanosis or ulcerations. Psych: History noted. Occasionally tearful, citing stress related to his medical condition in the setting of a family history of early cardiac death. Conversational content is appropriate and does not reflect aberrant mentation. Assessment:    Bilateral lower extremity pain    Plan:    Mr. Regi Shine reports a history of ambulatory  lower extremity pain, but has palpable pulses on examination.  This discomfort  may be related to swelling and a combination of CHF with chronic venous insufficiency, potentially pump-related ambulatory ischemia, or neurogenic given his report of

## 2022-07-11 NOTE — CASE COMMUNICATION
Case Management Initial Discharge Plan  Marciano Asp,         Readmission Risk              Risk of Unplanned Readmission:  12             Met with:patient to discuss discharge plans. Information verified: address, contacts, phone number, , insurance Yes  PCP: No primary care provider on file. Date of last visit: no    Insurance Provider: [facility]    Discharge Planning  Current Residence:  Sober living  Living Arrangements:      Home has 1 stories/0 stairs to climb  Support Systems:     Current Services PTA:  n/a Agency: none  Patient able to perform ADL's:Independent  DME in home:  0  DME used to aid ambulation prior to admission:   0  DME used during admission:  TBD    Potential Assistance Needed:       Pharmacy: [facility]   Potential Assistance Purchasing Medications:     Does patient want to participate in local refill/ meds to beds program?       Patient agreeable to home care: No  [place] of choice provided:  n/a      Type of Home Care Services:     Patient expects to be discharged to:       Prior SNF/Rehab Placement and Facility: n/a  Agreeable to SNF/Rehab: No  [place] of choice provided: n/a   Evaluation: no    Expected Discharge date: Follow Up Appointment: Best Day/ Time:      Transportation provider: family  Transportation arrangements needed for discharge: No    Discharge Plan:Unstable angina. Patient lives in a sober living home with others. Patient moved from [place] to [facility] about 5 months and lives in a sober living apartment. Santiago Tubbs is his contact. PCP list provided. Patient has a cardiac history, including CABG and Pacemaker. Patient left AMA less than a week ago. Cardiology consulted. Continue to follow. Plan TBD.           Electronically signed by Marlin Jacome RN on 22 at 3:34 PM EDT

## 2022-07-11 NOTE — PROGRESS NOTES
Comprehensive Nutrition Assessment    Type and Reason for Visit:  Initial,Positive Nutrition Screen (2-13 lb weight loss, decreased appetite, malnutrition score:2)    Nutrition Recommendations/Plan:   1. Continue Clear Liquids; no caffeine   2. Added Ensure Clear BID   3. Monitor po intakes, ONS acceptance, diet advancement/tolerance, GI status, weights, and labs     Malnutrition Assessment:  Malnutrition Status:  Insufficient data (07/11/22 1352)    Context:  Chronic Illness     Findings of the 6 clinical characteristics of malnutrition:  Energy Intake:  Mild decrease in energy intake (Comment)  Weight Loss:  Mild weight loss (specify amount and time period) (JENY)     Body Fat Loss:  Unable to assess     Muscle Mass Loss:  Unable to assess    Fluid Accumulation:  No significant fluid accumulation     Strength:  Not Performed    Nutrition Assessment:    Patient admission r/t unstable angina. Hx: STEMI, ischemic cardiomyopathy, A-fib with RVR, CHF with AICD placement. Pt with hx substance abuse and hep C. Pt reports abd pain/ nausea/emesis. Pt consumed 100% of Clear Liquids (currently feeling nauseous). He reports -215# -- states he has lost weight the past \"few weeks\". Pt thinks he currently weighs 180# however, pt bed scale weight shows 165#. EHR shows pt weighing 180 lb one year ago (8.3%) wt loss. Unable to verify wts. Poor appetite/intake r/t N/V/loose stools after eating. Also, Pt goes into discussion of how his AICD malfunctioned and he has had difficulties since. Will add Ensure Clear to meal trays. Monitor po intakes, diet advancement/tolerance, GI status, weights, and labs. Nutrition Related Findings:    N/V. Abd pain/diarrhea. Active bowel sounds. No edema. Wound Type: None       Current Nutrition Intake & Therapies:    Average Meal Intake: % (Clear Liquids)  Average Supplements Intake: None Ordered  ADULT DIET; Clear Liquid;  No Caffeine  ADULT ORAL NUTRITION SUPPLEMENT; Breakfast, Dinner; Clear Liquid Oral Supplement    Anthropometric Measures:  Height: 5' 9\" (175.3 cm)  Ideal Body Weight (IBW): 160 lbs (73 kg)    Admission Body Weight: 195 lb (88.5 kg)  Current Body Weight: 165 lb (74.8 kg), 103.1 % IBW. Weight Source: Bed Scale  Current BMI (kg/m2): 24.4  Usual Body Weight: 210 lb (95.3 kg)  % Weight Change (Calculated): -21.4  Weight Adjustment For: No Adjustment                 BMI Categories: Normal Weight (BMI 18.5-24. 9)    Estimated Daily Nutrient Needs:  Energy Requirements Based On: Kcal/kg  Weight Used for Energy Requirements: Current  Energy (kcal/day): 1881-9580 kcal (27-29 kcal/kg)  Weight Used for Protein Requirements: Current  Protein (g/day):  gm protein (1.2-1.4 g/kg)       Nutrition Diagnosis:   · Inadequate protein-energy intake related to inadequate protein-energy intake,altered GI function as evidenced by NPO or clear liquid status due to medical condition,poor intake prior to Sarath Controls loss,nausea,vomiting,diarrhea      Nutrition Interventions:   Food and/or Nutrient Delivery: Continue Current Diet,Start Oral Nutrition Supplement  Nutrition Education/Counseling: Education not indicated  Coordination of Nutrition Care: Continue to monitor while inpatient       Goals:     Goals: Meet at least 75% of estimated needs       Nutrition Monitoring and Evaluation:   Behavioral-Environmental Outcomes: None Identified  Food/Nutrient Intake Outcomes: Food and Nutrient Intake,Supplement Intake  Physical Signs/Symptoms Outcomes: Biochemical Data,Diarrhea,GI Status,Nausea or Vomiting,Weight,Skin    Discharge Planning:     Too soon to determine     Ty Ahn, [address]  Office Number: [phone]

## 2022-07-11 NOTE — H&P
[facility]  Office: [address], DO, Vincent Wolfe, DO, Aguila Dry, DO, Rajni Cortesu Blood, DO, Meagan Gregory MD, Leslye Nolasco MD, Michael Taveras MD, Joaquin Negrete MD, Omayra Nick MD, Noa Bray MD, Nika Adam MD, Medina Coats, DO, Chavez Morales MD,  Mikala Adorno DO, Spenser Marroquin MD, Pola Cervantes MD, Dipesh Alcala DO, Aris Samaniego MD, Gustavo Kapoor MD, Claudell Orion, DO, Edie Coyne MD, Marty Gerardo MD, Stephy Hernandez [facility], [facility], CNP, Jas Ryan, CNP, Jacky Neal, CNP, Philomena Solorzano, CNP, Latisha Bui, CNP, Peace Rod PA-C, Salina Dwyer, [facility], Chace Chinchilla, CNP, Berlinda Boast, CNP, Cory Abernathy, CNP, Devin Contreras, CNS, Noemi Cabral, [facility], Gera Baumann, CNP, Constance Juarez, [facility], [facility], [facility] [address]    HISTORY AND PHYSICAL EXAMINATION            Date:   7/11/2022  Patient name:  Johana Brice  Date of admission:  7/10/2022  7:44 PM  MRN:   [id number]  Account:  [de-identified]  YOB: 1970  PCP:    No primary care provider on file. Room:   2035/2035-01  Code Status:    Full Code    Chief Complaint:     Chief Complaint   Patient presents with    Chest Pain    Nausea    Shortness of Breath       History Obtained From:     patient    History of Present Illness:     Johana Brice is a 45 y.o. Non- / non  male who presents with Chest Pain, Nausea, and Shortness of Breath   and is admitted to the hospital for the management of Unstable angina ([facility] [address] Patient woke up with chest pain that radiated down his left arm and up his left neck this AM and reports his BP has been labile all day. He also was having abdominal pain, vomiting, loose stools. He also complains of feeling light-headed and dizzy. He says he has been out of his medications. Complains of a productive cough with yeloow and/or dark sputum. He vapes daily.  He was just hospitalized this past week for chest pain, but he left AMA before a cardiac cath or any other cardiac work-up could be completed. He says he follows with [address] cardiology. He has a significant past cardiac history that includes ischemic cardiomyopathy, VT, CABG, MI, chronic combined heart failure, and AICD. He also has a hx of drug abuse and hep C. While in ED, CXR was completed and was unremarkable. ProBNP was 497, tox screen was negative, trop were also unremarkable. He was admitted to the [facility] for further management of unstable angina. While in the ED    Past Medical History:     Past Medical History:   Diagnosis Date    AA (alcohol abuse)     Coronary artery disease involving native coronary artery of native heart     Drug abuse ([facility] [address] 07/05/2022 7/5 pt reports clean for 5 mos-took cocaine    Dyslipidemia     Hepatitis     Primary hypertension         Past Surgical History:     Past Surgical History:   Procedure Laterality Date    CARDIAC CATHETERIZATION      history of 5 stents    CARDIAC DEFIBRILLATOR PLACEMENT      [employer]    CORONARY ARTERY BYPASS GRAFT      2 vessel        Medications Prior to Admission:     Prior to Admission medications    Medication Sig Start Date End Date Taking?  Authorizing Provider   isosorbide mononitrate (IMDUR) 30 MG extended release tablet Take 1 tablet by mouth daily 7/6/22   Sara Nicolas,    metoprolol succinate (TOPROL XL) 25 MG extended release tablet Take 1 tablet by mouth daily 7/6/22   Sara Nicolas, DO   ASPIRIN LOW DOSE 81 MG chewable tablet Take 81 mg by mouth daily 6/17/22   Historical Provider, MD   atorvastatin (LIPITOR) 80 MG tablet Take 80 mg by mouth nightly 6/17/22   Historical Provider, MD   buPROPion (WELLBUTRIN) 100 MG tablet Take 100 mg by mouth in the morning, at noon, and at bedtime 6/3/22   Historical Provider, MD   cloNIDine (CATAPRES) 0.1 MG tablet Take 0.1 mg by mouth in the morning, at noon, and at bedtime 6/3/22   Historical Provider, MD   lisinopril (PRINIVIL;ZESTRIL) 2.5 MG tablet Take 2.5 mg by mouth daily 22   Historical Provider, MD   spironolactone (ALDACTONE) 25 MG tablet Take 25 mg by mouth daily 22   Historical Provider, MD   diazePAM (VALIUM PO) Take 10 mg by mouth 2 times daily    Historical Provider, MD        Allergies:     Morphine    Social History:     Tobacco:    reports that he has never smoked. He has never used smokeless tobacco.  Alcohol:      reports previous alcohol use. Drug Use:  has no history on file for drug use. Family History:     Family History   Family history unknown: Yes       Review of Systems:     Positive and Negative as described in HPI. Review of Systems   Constitutional: Positive for appetite change. Negative for chills and fever. HENT: Negative. Eyes: Negative. Respiratory: Positive for cough and shortness of breath. Cardiovascular: Positive for chest pain. Negative for palpitations and leg swelling. Gastrointestinal: Positive for abdominal pain, diarrhea, nausea and vomiting. Negative for abdominal distention. Genitourinary: Negative. Musculoskeletal: Positive for neck pain. Negative for arthralgias, back pain and myalgias. Skin: Negative. Neurological: Positive for dizziness and light-headedness. Psychiatric/Behavioral: Negative. Physical Exam:   /67   Pulse 69   Temp 97.5 °F (36.4 °C) (Temporal)   Resp 13   Ht 5' 9\" (1.753 m)   Wt 165 lb (74.8 kg)   SpO2 99%   BMI 24.37 kg/m²   Temp (24hrs), Av.9 °F (36.6 °C), Min:97.5 °F (36.4 °C), Max:98.2 °F (36.8 °C)    No results for input(s): POCGLU in the last 72 hours.     Intake/Output Summary (Last 24 hours) at 2022 0587  Last data filed at 2022 0400  Gross per 24 hour   Intake --   Output 700 ml   Net -700 ml       Physical Exam    Investigations:      Laboratory Testing:  Recent Results (from the past 24 hour(s))   Troponin    Collection Time: 07/10/22  7:46 PM   Result Value Ref Range    Troponin, High Sensitivity 15 0 - 22 ng/L   Basic Metabolic Panel    Collection Time: 07/10/22  7:46 PM   Result Value Ref Range    Glucose 110 (H) 70 - 99 mg/dL    BUN 16 6 - 20 mg/dL    CREATININE 1.20 0.70 - 1.20 mg/dL    Bun/Cre Ratio 13 9 - 20    Calcium 9.4 8.6 - 10.4 mg/dL    Sodium 138 135 - 144 mmol/L    Potassium 3.9 3.7 - 5.3 mmol/L    Chloride 102 98 - 107 mmol/L    CO2 24 20 - 31 mmol/L    Anion Gap 12 9 - 17 mmol/L    GFR Non-African American >60 >60 mL/min    GFR African American >60 >60 mL/min    GFR Comment         Brain Natriuretic Peptide    Collection Time: 07/10/22  7:46 PM   Result Value Ref Range    Pro- (H) <300 pg/mL   CBC with Auto Differential    Collection Time: 07/10/22  7:46 PM   Result Value Ref Range    WBC 10.1 3.5 - 11.3 k/uL    RBC 5.14 4.21 - 5.77 m/uL    Hemoglobin 15.8 13.0 - 17.0 g/dL    Hematocrit 46.6 40.7 - 50.3 %    MCV 90.7 82.6 - 102.9 fL    MCH 30.7 25.2 - 33.5 pg    MCHC 33.9 28.4 - 34.8 g/dL    RDW 14.7 (H) 11.8 - 14.4 %    Platelets 822 627 - 284 k/uL    MPV 9.4 8.1 - 13.5 fL    NRBC Automated 0.0 0.0 per 100 WBC    RBC Morphology ANISOCYTOSIS PRESENT     Seg Neutrophils 64 36 - 65 %    Lymphocytes 26 24 - 43 %    Monocytes 6 3 - 12 %    Eosinophils % 2 1 - 4 %    Basophils 1 0 - 2 %    Immature Granulocytes 0 0 %    Segs Absolute 6.47 1.50 - 8.10 k/uL    Absolute Lymph # 2.63 1.10 - 3.70 k/uL    Absolute Mono # 0.63 0.10 - 1.20 k/uL    Absolute Eos # 0.24 0.00 - 0.44 k/uL    Basophils Absolute 0.06 0.00 - 0.20 k/uL    Absolute Immature Granulocyte 0.04 0.00 - 0.30 k/uL   Ethanol    Collection Time: 07/10/22  7:46 PM   Result Value Ref Range    Ethanol <10 <10 mg/dL    Ethanol percent <0.010 <0.010 %   Urine Drug Screen    Collection Time: 07/10/22  9:17 PM   Result Value Ref Range    Amphetamine Screen, Ur NEGATIVE NEGATIVE    Barbiturate Screen, Ur NEGATIVE NEGATIVE    Benzodiazepine Screen, Urine NEGATIVE NEGATIVE    Cocaine Metabolite, Urine NEGATIVE NEGATIVE    Methadone Screen, Urine NEGATIVE NEGATIVE    Opiates, Urine NEGATIVE NEGATIVE    Phencyclidine, Urine NEGATIVE NEGATIVE    Cannabinoid Scrn, Ur NEGATIVE NEGATIVE    Oxycodone Screen, Ur NEGATIVE NEGATIVE    Test Information       Assay provides medical screening only. The absence of expected drug(s) and/or metabolite(s) may indicate diluted or adulterated urine, limitations of testing or timing of collection.    Urinalysis with Microscopic    Collection Time: 07/10/22  9:17 PM   Result Value Ref Range    Color, UA Yellow Yellow    Turbidity UA Clear Clear    Glucose, Ur NEGATIVE NEGATIVE    Bilirubin Urine NEGATIVE NEGATIVE    Ketones, Urine NEGATIVE NEGATIVE    Specific Gravity, UA 1.020 1.005 - 1.030    Urine Hgb NEGATIVE NEGATIVE    pH, UA 6.5 5.0 - 8.0    Protein, UA NEGATIVE NEGATIVE    Urobilinogen, Urine Normal Normal    Nitrite, Urine NEGATIVE NEGATIVE    Leukocyte Esterase, Urine NEGATIVE NEGATIVE    -          WBC, UA 0 TO 2 0 - 5 /HPF    RBC, UA None 0 - 2 /HPF    Epithelial Cells UA 0 TO 2 0 - 5 /HPF   Troponin    Collection Time: 07/10/22 10:14 PM   Result Value Ref Range    Troponin, High Sensitivity 16 0 - 22 ng/L   Troponin    Collection Time: 07/11/22  1:16 AM   Result Value Ref Range    Troponin, High Sensitivity 17 0 - 22 ng/L   CBC    Collection Time: 07/11/22  1:16 AM   Result Value Ref Range    WBC 8.8 3.5 - 11.3 k/uL    RBC 4.59 4.21 - 5.77 m/uL    Hemoglobin 14.3 13.0 - 17.0 g/dL    Hematocrit 42.7 40.7 - 50.3 %    MCV 93.0 82.6 - 102.9 fL    MCH 31.2 25.2 - 33.5 pg    MCHC 33.5 28.4 - 34.8 g/dL    RDW 15.1 (H) 11.8 - 14.4 %    Platelets 021 549 - 696 k/uL    MPV 9.5 8.1 - 13.5 fL    NRBC Automated 0.0 0.0 per 100 WBC   Potassium    Collection Time: 07/11/22  1:16 AM   Result Value Ref Range    Potassium 4.2 3.7 - 5.3 mmol/L   Magnesium    Collection Time: 07/11/22  1:16 AM   Result Value Ref Range    Magnesium 2.0 1.6 - 2.6 mg/dL Imaging/Diagnostics:  XR CHEST PORTABLE    Result Date: 7/10/2022  No acute cardiopulmonary abnormality identified. XR CHEST PORTABLE    Result Date: 7/5/2022  Mild cardiomegaly status post CABG and ICD placement. Otherwise negative exam.     XR CHEST 1 VIEW    Result Date: 7/6/2022  No evidence of acute process. CT CHEST PULMONARY EMBOLISM W CONTRAST    Result Date: 7/5/2022  No central pulmonary embolus identified. Tiny peripheral branches are not well evaluated secondary to motion. Mildly dilated ascending aorta. There is mild fat stranding seen in the upper abdomen, surrounding the spleen. This is partially imaged This is of uncertain significance. This may simply be due to fluid overload. Consider abdomen and pelvis CT with IV contrast for further characterization Trace pleural fluid raising the question of mild fluid overload     CTA CHEST ABDOMEN PELVIS W CONTRAST    Result Date: 7/10/2022  Aneurysmal distension of ascending thoracic aorta measuring 5.4 x 4.8 cm in comparison to the normal diameter of the descending thoracic aorta measuring 2.5 x 2.6 cm. No acute pathology within the chest abdomen and pelvis. No dissection. No acute intimal injury. No pulmonary embolism. There are scattered areas of atherosclerotic calcification within the internal iliac arterial branches resulting in focal areas of mild to moderate stenosis.  RECOMMENDATIONS: Unavailable       Assessment :      Hospital Problems           Last Modified POA    * (Principal) Unstable angina ([address] 7/11/2022 Yes    Dyslipidemia 7/11/2022 Yes    Essential hypertension, benign 7/11/2022 Yes    Anxiety 7/11/2022 Yes    Chronic combined systolic and diastolic heart failure ([address] 7/11/2022 Yes    Overview Signed 7/11/2022  5:23 AM by BRAIN Alfredo NP     Formatting of this note might be different from the original.        Last Assessment & Plan:   Formatting of this note might be different from the original.  -CXR-negative  -Echo (2/2021) with EF of 16%, (7/2021) with EF of 30%,   -Most recent echo (10/31) with EF of 25-30%  -s/p ICD   -BNP-1,294 , baseline 1-2k  -SPECT (2/2021) with large fixed defect involving anterior, anterolateral, and inferolateral walls. Severe global hypokinesias with akinesis  -appears relatively euvolemic on exam, on RA, no edema noted   -Continue home GDMT as tolerated, on lisinopril               Plan:     Patient status inpatient in the  Progressive Unit/Step down    1. Unstable angina: Cardiology consulted. Continue cardiac meds as ordered. Trend troponins. Pain control. Telemetry. 2. HTN: Monitor VS q 4 hours. Control BP. Hold lisinopril 2/2 elevated creat. 3. Anxiety: Valium 5 mg q 6 hours prn  4. Chronic combined heart failure: continue BB, aldactone. Parameters in place  5. DVT prophylaxis   6. Protonix daily  7. HLD: Cont statin    Consultations:   IP CONSULT TO HOSPITALIST     Patient is admitted as inpatient status because of co-morbidities listed above, severity of signs and symptoms as outlined, requirement for current medical therapies and most importantly because of direct risk to patient if care not provided in a hospital setting. Expected length of stay > 48 hours. Total 35 mins spent in the completion of this H&P    BRAIN Alfredo NP  7/11/2022  5:46 AM    Copy sent to Dr. Arreola primary care provider on file.

## 2022-07-11 NOTE — ED PROVIDER NOTES
file   Tobacco Use    Smoking status: Never Smoker    Smokeless tobacco: Never Used   Vaping Use    Vaping Use: Every day   Substance and Sexual Activity    Alcohol use: Not Currently    Drug use: Not on file    Sexual activity: Not on file   Other Topics Concern    Not on file   Social History Narrative    Not on file     Social Determinants of Health     Financial Resource Strain:     Difficulty of Paying Living Expenses: Not on file   Food Insecurity:     Worried About Running Out of Food in the Last Year: Not on file    Jonna of Food in the Last Year: Not on file   Transportation Needs:     Lack of Transportation (Medical): Not on file    Lack of Transportation (Non-Medical): Not on file   Physical Activity:     Days of Exercise per Week: Not on file    Minutes of Exercise per Session: Not on file   Stress:     Feeling of Stress : Not on file   Social Connections:     Frequency of Communication with Friends and Family: Not on file    Frequency of Social Gatherings with Friends and Family: Not on file    Attends Caodaism Services: Not on file    Active Member of [address] [place] [address] or Organizations: Not on file    Attends Club or Organization Meetings: Not on file    Marital Status: Not on file   Intimate Partner Violence:     Fear of Current or Ex-Partner: Not on file    Emotionally Abused: Not on file    Physically Abused: Not on file    Sexually Abused: Not on file   Housing Stability:     Unable to Pay for Housing in the Last Year: Not on file    Number of [place] in the Last Year: Not on file    Unstable Housing in the Last Year: Not on file       Family History   Family history unknown: Yes       Allergies:  Morphine    Home Medications:  Prior to Admission medications    Medication Sig Start Date End Date Taking?  Authorizing Provider   isosorbide mononitrate (IMDUR) 30 MG extended release tablet Take 1 tablet by mouth daily 7/6/22   Sara Norris,    metoprolol succinate (TOPROL XL) 25 MG extended release tablet Take 1 tablet by mouth daily 7/6/22   Sara Blackwooder,    ASPIRIN LOW DOSE 81 MG chewable tablet Take 81 mg by mouth daily 6/17/22   Historical Provider, MD   atorvastatin (LIPITOR) 80 MG tablet Take 80 mg by mouth nightly 6/17/22   Historical Provider, MD   buPROPion (WELLBUTRIN) 100 MG tablet Take 100 mg by mouth in the morning, at noon, and at bedtime 6/3/22   Historical Provider, MD   cloNIDine (CATAPRES) 0.1 MG tablet Take 0.1 mg by mouth in the morning, at noon, and at bedtime 6/3/22   Historical Provider, MD   lisinopril (PRINIVIL;ZESTRIL) 2.5 MG tablet Take 2.5 mg by mouth daily 6/17/22   Historical Provider, MD   spironolactone (ALDACTONE) 25 MG tablet Take 25 mg by mouth daily 6/17/22   Historical Provider, MD   diazePAM (VALIUM PO) Take 10 mg by mouth 2 times daily    Historical Provider, MD       REVIEW OF SYSTEMS    (2-9 systems for level 4, 10 or more for level 5)      Review of Systems   Constitutional: Negative for chills and fever. Eyes: Negative for discharge and redness. Respiratory: Positive for shortness of breath. Cardiovascular: Positive for chest pain. Gastrointestinal: Positive for nausea. Negative for abdominal pain. Genitourinary: Negative for dysuria. Musculoskeletal: Negative for arthralgias. Skin: Negative for color change and rash. Allergic/Immunologic: Positive for environmental allergies. Neurological: Negative for headaches. Psychiatric/Behavioral: Negative for agitation and confusion. PHYSICAL EXAM   (up to 7 for level 4, 8 or more for level 5)     INITIAL VITALS:    height is 5' 9\" (1.753 m) and weight is 165 lb (74.8 kg). His temporal temperature is 98.1 °F (36.7 °C). His blood pressure is 145/90 (abnormal) and his pulse is 62. His respiration is 15 and oxygen saturation is 99%. Physical Exam  Vitals and nursing note reviewed. Constitutional:       General: He is in acute distress.       Appearance: He is previous EKGs as well as current 1. The depression has resolved on repeat EKG and elevations in V1, V2, V3 are present on previous EKGs. STEMI alert canceled at that time. Patient continuing to request pain medication. Will get labs, aspirin, reassess. Discussed with [address] [place] [address] cardiology group. DIAGNOSTIC RESULTS / EMERGENCY DEPARTMENT COURSE / MDM     LABS:  Labs Reviewed   BASIC METABOLIC PANEL - Abnormal; Notable for the following components:       Result Value    Glucose 110 (*)     All other components within normal limits   BRAIN NATRIURETIC PEPTIDE - Abnormal; Notable for the following components:    Pro- (*)     All other components within normal limits   CBC WITH AUTO DIFFERENTIAL - Abnormal; Notable for the following components:    RDW 14.7 (*)     All other components within normal limits   CBC - Abnormal; Notable for the following components:    RDW 15.1 (*)     All other components within normal limits   TROPONIN   URINE DRUG SCREEN   URINALYSIS WITH MICROSCOPIC   ETHANOL   TROPONIN   TROPONIN   POTASSIUM   MAGNESIUM         RADIOLOGY:  XR CHEST PORTABLE    Result Date: 7/10/2022  EXAMINATION: ONE XRAY VIEW OF THE CHEST 7/10/2022 7:59 pm COMPARISON: 07/06/2022 HISTORY: ORDERING SYSTEM PROVIDED HISTORY: STEMI TECHNOLOGIST PROVIDED HISTORY: STEMI Reason for Exam: STEMI, chest pain FINDINGS: The heart appears mildly enlarged. No pleural effusion or pneumothorax is seen. No focal lung consolidation is identified. There is a left-sided cardiac device with multiple leads. Sternotomy wires are seen. No acute cardiopulmonary abnormality identified. CTA CHEST ABDOMEN PELVIS W CONTRAST    Result Date: 7/10/2022  EXAMINATION: CTA OF THE CHEST, ABDOMEN AND PELVIS WITH CONTRAST 7/10/2022 10:23 pm: TECHNIQUE: CTA of the chest, abdomen and pelvis was performed after the administration of intravenous contrast.  Multiplanar reformatted images are provided for review.   MIP images are provided for review. Automated exposure control, iterative reconstruction, and/or weight based adjustment of the mA/kV was utilized to reduce the radiation dose to as low as reasonably achievable. COMPARISON: None. HISTORY: ORDERING SYSTEM PROVIDED HISTORY: Severe chest pain, trops negative previously, multiple stents. Aortic dilatation previously, no previous imaging, rule out dissection TECHNOLOGIST PROVIDED HISTORY: Severe chest pain, trops negative previously, multiple stents. Aortic dilatation previously, no previous imaging, rule out dissection Decision Support Exception - unselect if not a suspected or confirmed emergency medical condition->Emergency Medical Condition (MA) FINDINGS: CT chest: CT angiogram: No dissection. No intimal injury. No hemodynamically significant stenosis. Aorta and the origin of major arteries are widely patent. There is aneurysmal distension of ascending thoracic aorta measuring 5.4 x 4.8 cm in comparison to the normal diameter of the descending thoracic aorta measuring 2.5 x 2.6 cm. Lines and tubes:  None. Lungs and Airways and Pleura:  Central airways are patent. No lung consolidation. No pleural effusion. No pneumothorax. Lymph nodes: No pathologically enlarged mediastinal, hilar, lower cervical, or chest wall lymph nodes. Cardiovascular and Mediastinum: No acute aortic pathology. Cardiac pacemaker leads are noted. Coronary arterial calcification and stents are noted. Cardiac chamber sizes appear to measure within normal limits on this non ECG gated study. No pericardial effusion. The thyroid gland is unremarkable. The esophagus is unremarkable. Bones/Soft tissues: No fracture. No definite suspicious lytic or blastic foci. status post sternotomy. CT abdomen and pelvis: CT angiogram: No dissection. No intimal injury. No hemodynamically significant stenosis. Aorta and the origin of major arteries are widely patent.   There are scattered areas of atherosclerotic calcification within the aorta, not resulting in hemodynamically significant stenosis. There are scattered areas of atherosclerotic calcification within the internal iliac arterial branches resulting in focal areas of mild to moderate stenosis. Organs: The liver, spleen, adrenal glands, gallbladder, pancreas, kidneys and ureters and pelvic organs including the urinary bladder appear unremarkable. Peritoneum / Retroperitoneum:  No free air or free fluid is noted. No pathologically enlarged lymphadenopathy. The vasculature do not demonstrate acute abnormality. GI Tract:  No distention or wall thickening. Bones and Soft Tissues: No fracture. No concerning lytic or sclerotic lesions are noted. Aneurysmal distension of ascending thoracic aorta measuring 5.4 x 4.8 cm in comparison to the normal diameter of the descending thoracic aorta measuring 2.5 x 2.6 cm. No acute pathology within the chest abdomen and pelvis. No dissection. No acute intimal injury. No pulmonary embolism. There are scattered areas of atherosclerotic calcification within the internal iliac arterial branches resulting in focal areas of mild to moderate stenosis. RECOMMENDATIONS: Unavailable       EKG  Rhythm: normal sinus   Rate: normal  Axis: normal  Conduction: normal  ST Segments: Elevation V1, V2, V3  T Waves: no acute change  Q Waves: no acute change     Clinical Impression: Appearing unchanged from previous EKG 7/6    All EKG's are interpreted by the Emergency Department Physician who either signs or Co-signs this chart in the absence of a cardiologist.    EMERGENCY DEPARTMENT COURSE:  ED Course as of 07/11/22 0946   Sun Jul 10, 2022   2042 Discussed with Dr. Shelbi Riley on-call for [address] [place] [address]. He states given some dynamic changes between EKGs and patient's continued symptoms would recommend observation but agreed with CTA rule out dissection. [MS]   4877 Rule out dissection however low suspicion, admission per cardiologist given severe pain high risk.  [MS] 2303 Aneurysmal dilatation up to 5.6 cm slightly enlarged from previous but no signs of dissection and mural thrombus. No indication for transfer. Will admit. [MS]      ED Course User Index  [MS] Mark Glass, DO     CTA dissection study obtained given patient's severe pain however it Edward Guerra is unlikely. Did have previous sub-5 cm aortic aneurysm. CTA showing slight increase in size of aneurysmal levitation at 5.6. No signs of dissection or intramural thrombus. Continue to have reported severe pain requiring multiple doses of Dilaudid. Upon my entry to room patient was appearing to be sleeping when awakened immediately began complaining of pain. 2 troponins stable. Did discuss with on-call [address] [place] [address] cardiology group and given patient's high risk status as well as continued severe pain would recommend patient be admitted for reevaluation in a.m. Patient agreeable. Discussed with hospitalist agreeable to admission. PROCEDURES:  None    CONSULTS:  IP CONSULT TO HOSPITALIST  IP CONSULT TO VASCULAR SURGERY    CRITICAL CARE:  Due to the high probability of sudden and clinically significant deterioration in the patient's condition patient required highest level of my preparedness to intervene urgently. I provided critical care time including documentation time, medication orders and management, reevaluation, vital sign assessment, ordering and reviewing of of lab tests ordering and reviewing of x-ray studies, and admission orders. Aggregate critical care time is 30 minutes including only time during which I was engaged in work directly related to patient care and did not include time spent treating other patients simultaneously. FINAL IMPRESSION      1. Unstable angina ([address]    2. Ascending aortic aneurysm ([address]          DISPOSITION / PLAN     DISPOSITION Admitted 07/10/2022 11:37:34 PM        PATIENTREFERRED TO:  No follow-up provider specified.     DISCHARGE MEDICATIONS:  Current Discharge Medication List          Joie Hall DO  EmergencyMedicine Attending    (Please note that portions of this note were completed with a voice recognition program.  Efforts were made to edit the dictations but occasionally words are mis-transcribed.)       Joie Hall DO  07/11/22 9974

## 2022-07-11 NOTE — ED NOTES
Pt presents to the ER for chest pain, SOB, nausea. Pt states he has been having on and off chest pain since this morning and it has been getting worse. Pt states it feels like something is sitting on his chest stabbing him. Pt states he is nauseous and cannot breath. Pt oxygen saturation is 100% on room air. Pt placed on 2L for comfort. Pt states he has 5 stents in place from [place]. Pt was recently admitted and was going to have a heart cath and walked out of the hospital before the procedure because he was scared.  Pt was given Nitro spray in route by [facility] [employer], RN  07/10/22 [address] Paty Mckeon RN  07/10/22 2009

## 2022-07-12 LAB
ABSOLUTE EOS #: 0.37 K/UL (ref 0–0.44)
ABSOLUTE IMMATURE GRANULOCYTE: 0.01 K/UL (ref 0–0.3)
ABSOLUTE LYMPH #: 1.92 K/UL (ref 1.1–3.7)
ABSOLUTE MONO #: 0.51 K/UL (ref 0.1–1.2)
ANION GAP SERPL CALCULATED.3IONS-SCNC: 7 MMOL/L (ref 9–17)
BASOPHILS # BLD: 1 % (ref 0–2)
BASOPHILS ABSOLUTE: 0.05 K/UL (ref 0–0.2)
BUN BLDV-MCNC: 11 MG/DL (ref 6–20)
BUN/CREAT BLD: 11 (ref 9–20)
CALCIUM SERPL-MCNC: 8.4 MG/DL (ref 8.6–10.4)
CHLORIDE BLD-SCNC: 101 MMOL/L (ref 98–107)
CO2: 28 MMOL/L (ref 20–31)
CREAT SERPL-MCNC: 1.04 MG/DL (ref 0.7–1.2)
EKG ATRIAL RATE: 60 BPM
EKG P AXIS: 58 DEGREES
EKG P-R INTERVAL: 194 MS
EKG Q-T INTERVAL: 428 MS
EKG QRS DURATION: 106 MS
EKG QTC CALCULATION (BAZETT): 428 MS
EKG R AXIS: 95 DEGREES
EKG T AXIS: -173 DEGREES
EKG VENTRICULAR RATE: 60 BPM
EOSINOPHILS RELATIVE PERCENT: 5 % (ref 1–4)
GFR AFRICAN AMERICAN: >60 ML/MIN
GFR NON-AFRICAN AMERICAN: >60 ML/MIN
GFR SERPL CREATININE-BSD FRML MDRD: ABNORMAL ML/MIN/{1.73_M2}
GLUCOSE BLD-MCNC: 128 MG/DL (ref 70–99)
HCT VFR BLD CALC: 42 % (ref 40.7–50.3)
HEMOGLOBIN: 13.5 G/DL (ref 13–17)
IMMATURE GRANULOCYTES: 0 %
LYMPHOCYTES # BLD: 28 % (ref 24–43)
MCH RBC QN AUTO: 30.5 PG (ref 25.2–33.5)
MCHC RBC AUTO-ENTMCNC: 32.1 G/DL (ref 28.4–34.8)
MCV RBC AUTO: 94.8 FL (ref 82.6–102.9)
MONOCYTES # BLD: 7 % (ref 3–12)
NRBC AUTOMATED: 0 PER 100 WBC
PDW BLD-RTO: 15.1 % (ref 11.8–14.4)
PLATELET # BLD: 210 K/UL (ref 138–453)
PMV BLD AUTO: 9.5 FL (ref 8.1–13.5)
POTASSIUM SERPL-SCNC: 4.2 MMOL/L (ref 3.7–5.3)
RBC # BLD: 4.43 M/UL (ref 4.21–5.77)
RBC # BLD: ABNORMAL 10*6/UL
SEG NEUTROPHILS: 59 % (ref 36–65)
SEGMENTED NEUTROPHILS ABSOLUTE COUNT: 4.01 K/UL (ref 1.5–8.1)
SODIUM BLD-SCNC: 136 MMOL/L (ref 135–144)
WBC # BLD: 6.9 K/UL (ref 3.5–11.3)

## 2022-07-12 PROCEDURE — 2060000000 HC ICU INTERMEDIATE R&B

## 2022-07-12 PROCEDURE — 80048 BASIC METABOLIC PNL TOTAL CA: CPT

## 2022-07-12 PROCEDURE — G0378 HOSPITAL OBSERVATION PER HR: HCPCS

## 2022-07-12 PROCEDURE — 6360000002 HC RX W HCPCS: Performed by: NURSE PRACTITIONER

## 2022-07-12 PROCEDURE — 93010 ELECTROCARDIOGRAM REPORT: CPT | Performed by: INTERNAL MEDICINE

## 2022-07-12 PROCEDURE — 6370000000 HC RX 637 (ALT 250 FOR IP): Performed by: NURSE PRACTITIONER

## 2022-07-12 PROCEDURE — 94761 N-INVAS EAR/PLS OXIMETRY MLT: CPT

## 2022-07-12 PROCEDURE — 6370000000 HC RX 637 (ALT 250 FOR IP): Performed by: INTERNAL MEDICINE

## 2022-07-12 PROCEDURE — 2580000003 HC RX 258: Performed by: NURSE PRACTITIONER

## 2022-07-12 PROCEDURE — 36415 COLL VENOUS BLD VENIPUNCTURE: CPT

## 2022-07-12 PROCEDURE — 93925 LOWER EXTREMITY STUDY: CPT

## 2022-07-12 PROCEDURE — 85025 COMPLETE CBC W/AUTO DIFF WBC: CPT

## 2022-07-12 PROCEDURE — 96376 TX/PRO/DX INJ SAME DRUG ADON: CPT

## 2022-07-12 PROCEDURE — 99232 SBSQ HOSP IP/OBS MODERATE 35: CPT | Performed by: INTERNAL MEDICINE

## 2022-07-12 PROCEDURE — 96372 THER/PROPH/DIAG INJ SC/IM: CPT

## 2022-07-12 PROCEDURE — 2700000000 HC OXYGEN THERAPY PER DAY

## 2022-07-12 RX ORDER — ISOSORBIDE MONONITRATE 30 MG/1
60 TABLET, EXTENDED RELEASE ORAL ONCE
Status: COMPLETED | OUTPATIENT
Start: 2022-07-12 | End: 2022-07-12

## 2022-07-12 RX ORDER — QUETIAPINE FUMARATE 50 MG/1
100 TABLET, FILM COATED ORAL NIGHTLY PRN
Status: ON HOLD | COMMUNITY
End: 2022-07-14 | Stop reason: SDUPTHER

## 2022-07-12 RX ORDER — PHENOL 1.4 %
10 AEROSOL, SPRAY (ML) MUCOUS MEMBRANE NIGHTLY
COMMUNITY

## 2022-07-12 RX ORDER — BUPROPION HYDROCHLORIDE 100 MG/1
100 TABLET, EXTENDED RELEASE ORAL 2 TIMES DAILY
Status: ON HOLD | COMMUNITY
End: 2022-07-14 | Stop reason: HOSPADM

## 2022-07-12 RX ORDER — POTASSIUM CHLORIDE 750 MG/1
10 TABLET, EXTENDED RELEASE ORAL DAILY
Status: ON HOLD | COMMUNITY
End: 2022-07-14 | Stop reason: HOSPADM

## 2022-07-12 RX ORDER — TAMSULOSIN HYDROCHLORIDE 0.4 MG/1
0.4 CAPSULE ORAL DAILY
Status: ON HOLD | COMMUNITY
End: 2022-07-14 | Stop reason: SDUPTHER

## 2022-07-12 RX ORDER — RANOLAZINE 500 MG/1
500 TABLET, EXTENDED RELEASE ORAL 2 TIMES DAILY
Status: DISCONTINUED | OUTPATIENT
Start: 2022-07-12 | End: 2022-07-14 | Stop reason: HOSPADM

## 2022-07-12 RX ORDER — HYDROXYZINE HYDROCHLORIDE 25 MG/1
25 TABLET, FILM COATED ORAL 3 TIMES DAILY PRN
COMMUNITY

## 2022-07-12 RX ORDER — ALBUTEROL SULFATE 90 UG/1
2 AEROSOL, METERED RESPIRATORY (INHALATION) EVERY 6 HOURS PRN
COMMUNITY

## 2022-07-12 RX ORDER — ISOSORBIDE MONONITRATE 60 MG/1
120 TABLET, EXTENDED RELEASE ORAL DAILY
Status: DISCONTINUED | OUTPATIENT
Start: 2022-07-13 | End: 2022-07-14 | Stop reason: HOSPADM

## 2022-07-12 RX ADMIN — BUPROPION HYDROCHLORIDE 100 MG: 100 TABLET, FILM COATED ORAL at 09:05

## 2022-07-12 RX ADMIN — ATORVASTATIN CALCIUM 80 MG: 80 TABLET, FILM COATED ORAL at 20:09

## 2022-07-12 RX ADMIN — DIAZEPAM 5 MG: 5 TABLET ORAL at 15:42

## 2022-07-12 RX ADMIN — SODIUM CHLORIDE, PRESERVATIVE FREE 10 ML: 5 INJECTION INTRAVENOUS at 09:09

## 2022-07-12 RX ADMIN — METOPROLOL SUCCINATE 25 MG: 25 TABLET, EXTENDED RELEASE ORAL at 09:05

## 2022-07-12 RX ADMIN — BUPROPION HYDROCHLORIDE 100 MG: 100 TABLET, FILM COATED ORAL at 20:09

## 2022-07-12 RX ADMIN — HYDROMORPHONE HYDROCHLORIDE 0.5 MG: 1 INJECTION, SOLUTION INTRAMUSCULAR; INTRAVENOUS; SUBCUTANEOUS at 20:09

## 2022-07-12 RX ADMIN — SPIRONOLACTONE 25 MG: 25 TABLET ORAL at 09:10

## 2022-07-12 RX ADMIN — RANOLAZINE 500 MG: 500 TABLET, FILM COATED, EXTENDED RELEASE ORAL at 20:09

## 2022-07-12 RX ADMIN — HYDROMORPHONE HYDROCHLORIDE 0.5 MG: 1 INJECTION, SOLUTION INTRAMUSCULAR; INTRAVENOUS; SUBCUTANEOUS at 04:51

## 2022-07-12 RX ADMIN — DIAZEPAM 5 MG: 5 TABLET ORAL at 02:43

## 2022-07-12 RX ADMIN — DIAZEPAM 5 MG: 5 TABLET ORAL at 22:50

## 2022-07-12 RX ADMIN — ISOSORBIDE MONONITRATE 60 MG: 30 TABLET, EXTENDED RELEASE ORAL at 15:33

## 2022-07-12 RX ADMIN — HYDROMORPHONE HYDROCHLORIDE 0.5 MG: 1 INJECTION, SOLUTION INTRAMUSCULAR; INTRAVENOUS; SUBCUTANEOUS at 09:08

## 2022-07-12 RX ADMIN — HYDROMORPHONE HYDROCHLORIDE 0.5 MG: 1 INJECTION, SOLUTION INTRAMUSCULAR; INTRAVENOUS; SUBCUTANEOUS at 15:42

## 2022-07-12 RX ADMIN — SODIUM CHLORIDE, PRESERVATIVE FREE 10 ML: 5 INJECTION INTRAVENOUS at 20:09

## 2022-07-12 RX ADMIN — ENOXAPARIN SODIUM 40 MG: 100 INJECTION SUBCUTANEOUS at 09:08

## 2022-07-12 RX ADMIN — HYDROMORPHONE HYDROCHLORIDE 0.5 MG: 1 INJECTION, SOLUTION INTRAMUSCULAR; INTRAVENOUS; SUBCUTANEOUS at 00:44

## 2022-07-12 RX ADMIN — ISOSORBIDE MONONITRATE 60 MG: 30 TABLET, EXTENDED RELEASE ORAL at 09:05

## 2022-07-12 RX ADMIN — BUPROPION HYDROCHLORIDE 100 MG: 100 TABLET, FILM COATED ORAL at 15:33

## 2022-07-12 RX ADMIN — ASPIRIN 81 MG CHEWABLE TABLET 81 MG: 81 TABLET CHEWABLE at 09:08

## 2022-07-12 RX ADMIN — DIAZEPAM 5 MG: 5 TABLET ORAL at 09:05

## 2022-07-12 ASSESSMENT — PAIN DESCRIPTION - DESCRIPTORS
DESCRIPTORS: HEAVINESS
DESCRIPTORS: ACHING
DESCRIPTORS: ACHING
DESCRIPTORS: HEAVINESS
DESCRIPTORS: ACHING
DESCRIPTORS: HEAVINESS
DESCRIPTORS: ACHING
DESCRIPTORS: HEAVINESS
DESCRIPTORS: ACHING
DESCRIPTORS: HEAVINESS
DESCRIPTORS: ACHING
DESCRIPTORS: HEAVINESS
DESCRIPTORS: ACHING
DESCRIPTORS: HEAVINESS

## 2022-07-12 ASSESSMENT — PAIN SCALES - GENERAL
PAINLEVEL_OUTOF10: 6
PAINLEVEL_OUTOF10: 7
PAINLEVEL_OUTOF10: 2
PAINLEVEL_OUTOF10: 4
PAINLEVEL_OUTOF10: 6
PAINLEVEL_OUTOF10: 7
PAINLEVEL_OUTOF10: 6
PAINLEVEL_OUTOF10: 7
PAINLEVEL_OUTOF10: 5
PAINLEVEL_OUTOF10: 7
PAINLEVEL_OUTOF10: 6
PAINLEVEL_OUTOF10: 7
PAINLEVEL_OUTOF10: 4
PAINLEVEL_OUTOF10: 6
PAINLEVEL_OUTOF10: 6
PAINLEVEL_OUTOF10: 2

## 2022-07-12 ASSESSMENT — PAIN DESCRIPTION - LOCATION
LOCATION: CHEST

## 2022-07-12 ASSESSMENT — PAIN DESCRIPTION - FREQUENCY: FREQUENCY: INTERMITTENT

## 2022-07-12 ASSESSMENT — PAIN DESCRIPTION - ORIENTATION
ORIENTATION: LEFT
ORIENTATION: MID
ORIENTATION: LEFT
ORIENTATION: MID

## 2022-07-12 ASSESSMENT — PAIN - FUNCTIONAL ASSESSMENT
PAIN_FUNCTIONAL_ASSESSMENT: ACTIVITIES ARE NOT PREVENTED

## 2022-07-12 ASSESSMENT — PAIN DESCRIPTION - ONSET: ONSET: ON-GOING

## 2022-07-12 ASSESSMENT — PAIN DESCRIPTION - PAIN TYPE
TYPE: CHRONIC PAIN
TYPE: CHRONIC PAIN

## 2022-07-12 NOTE — PROGRESS NOTES
[facility]  Office: [address], DO, Trino Patterson, DO, Jose Luis Frederick, DO, Elizabeth Carlos Blood, DO, Abhijit Kumar MD, Bon Carrion MD, Fallon Nazario MD, Faustina Michael MD, Austin Son MD, Teresa Taveras MD, Hernandez Montenegro MD, Tamara Fuller, DO, Adam Anglin MD,  Radha Barakat, DO, Parminder Hector MD, Miguelangel Paulino MD, Zulma Munoz DO, Stefani Cano MD, Helen Castillo MD, Cassidy Aguilar DO, Rylie Aguilera MD, Terri Rea MD, Renetta Hamilton, [facility], [facility] Crowell, CNP, Leonela Espinal, CNP, Jaqui oTlbert, CNP, Vincent Jones, CNP, Bernadette Donaldson, CNP, KRISTI RosarioC, Nettie Fernandez, [facility], Angelica Grier, CNP, Grisel Pabon, CNP, Renetta Albarado, CNP, Gomez Young, CNS, Marti Rankin, [facility], Adam Barclay, CNP, Arleen Uribe, CNP, Purnima Olea, [facility]    Progress Note    7/12/2022    10:12 AM    Name:   Corby Carr  MRN:     [id number]     [place]:      [de-identified]   Room:   2035/203501   Day:  2  Admit Date:  7/10/2022  7:44 PM    PCP:   No primary care provider on file. Code Status:  Full Code    Subjective:     C/C:   Chief Complaint   Patient presents with    Chest Pain    Nausea    Shortness of Breath     Interval History Status: not changed. Patient seen and examined this morning. No acute events overnight. Continues report that he is having retrosternal chest pain radiating under his left breast.  Tearful, as he has been in and out of the hospital for several months regarding the same issues. Has not been seen by cardiology yet. Unable to explain why he is on oxygen. Reports he was on oxygen in [facility], and was sent home with a period, but has not had access to it since moving to [place]. Reports urinary symptoms of urinary hesitancy and frequent stops/stopping of his stream.  Also reports a 4-day history of diarrhea.  Marginal BP this AM.    Brief History:     Corby Carr is a 58-year-old male who presents to [facility] on 7/10/2022 for evaluation of ongoing chest pain. He has a history of CABG x2 in 2016 in [facility]. He also has an AICD present. Associated with his chest pain and shortness of breath and diaphoresis. He does also report that he has had claudication symptoms affecting the bilateral lower extremities when he is walking as well as occasional numbness of the legs and sometimes the hands. Review of Systems:     Constitutional:  negative for chills, fevers, sweats  Respiratory: Reports occasional dyspnea; negative for cough, wheezing  Cardiovascular: Reports chest pain; negative for lower extremity edema, palpitations  Gastrointestinal:  negative for abdominal pain, constipation, diarrhea, nausea, vomiting  Neurological:  negative for dizziness, headache  Musculoskeletal: Reports lower extremity burning/tingling with ambulation    Medications: Allergies: Allergies   Allergen Reactions    Morphine Itching       Current Meds:   Scheduled Meds:    aspirin  81 mg Oral Daily    buPROPion  100 mg Oral TID    cloNIDine  0.1 mg Oral TID    [Held by provider] lisinopril  2.5 mg Oral Daily    metoprolol succinate  25 mg Oral Daily    spironolactone  25 mg Oral Daily    sodium chloride flush  5-40 mL IntraVENous 2 times per day    atorvastatin  80 mg Oral Nightly    enoxaparin  40 mg SubCUTAneous Daily    isosorbide mononitrate  60 mg Oral Daily     Continuous Infusions:    sodium chloride       PRN Meds: sodium chloride flush, sodium chloride, ondansetron **OR** ondansetron, acetaminophen **OR** acetaminophen, polyethylene glycol, nitroGLYCERIN, HYDROmorphone, diazePAM    Data:     Past Medical History:   has a past medical history of AA (alcohol abuse), Coronary artery disease involving native coronary artery of native heart, Drug abuse ([facility] Dyslipidemia, Hepatitis, and Primary hypertension.     Social History:   reports that he has never smoked. He has never used smokeless tobacco. He reports previous alcohol use. Family History:   Family History   Family history unknown: Yes       Vitals:  BP 93/69   Pulse 67   Temp 97.9 °F (36.6 °C) (Temporal)   Resp 18   Ht 5' 9\" (1.753 m)   Wt 165 lb (74.8 kg)   SpO2 98%   BMI 24.37 kg/m²   Temp (24hrs), Av.1 °F (36.7 °C), Min:97.9 °F (36.6 °C), Max:98.4 °F (36.9 °C)    No results for input(s): POCGLU in the last 72 hours. I/O (24Hr): Intake/Output Summary (Last 24 hours) at 2022 1012  Last data filed at 2022 0500  Gross per 24 hour   Intake 480 ml   Output 2050 ml   Net -1570 ml       Labs:  Hematology:  Recent Labs     07/10/22  1946 07/11/22  0116 07/12/22  0454   WBC 10.1 8.8 6.9   RBC 5.14 4.59 4.43   HGB 15.8 14.3 13.5   HCT 46.6 42.7 42.0   MCV 90.7 93.0 94.8   MCH 30.7 31.2 30.5   MCHC 33.9 33.5 32.1   RDW 14.7* 15.1* 15.1*    249 210   MPV 9.4 9.5 9.5     Chemistry:  Recent Labs     07/10/22  1946 07/10/22  2214 07/11/22  0116 07/12/22  0454     --   --  136   K 3.9  --  4.2 4.2     --   --  101   CO2 24  --   --  28   GLUCOSE 110*  --   --  128*   BUN 16  --   --  11   CREATININE 1.20  --   --  1.04   MG  --   --  2.0  --    ANIONGAP 12  --   --  7*   LABGLOM >60  --   --  >60   GFRAA >60  --   --  >60   CALCIUM 9.4  --   --  8.4*   PROBNP 497*  --   --   --    TROPHS 15 16 17  --    No results for input(s): PROT, LABALBU, LABA1C, R0ETNCQ, Q2GODND, FT4, TSH, AST, ALT, LDH, GGT, ALKPHOS, LABGGT, BILITOT, BILIDIR, AMMONIA, AMYLASE, LIPASE, LACTATE, CHOL, HDL, LDLCHOLESTEROL, CHOLHDLRATIO, TRIG, VLDL, OEA68DV, PHENYTOIN, PHENYF, URICACID, POCGLU in the last 72 hours.   ABG:No results found for: POCPH, PHART, PH, POCPCO2, FLK4ZXI, PCO2, POCPO2, PO2ART, PO2, POCHCO3, BYF9WAK, HCO3, NBEA, PBEA, BEART, BE, THGBART, THB, DUU9THA, APMW0BLN, I7WQVKCL, O2SAT, FIO2  No results found for: SPECIAL  No results found for: CULTURE    Radiology:  XR CERVICAL SPINE (2-3 AM by Alexandra Clemons, APRN - NP     Formatting of this note might be different from the original.  5/2016 CABG including LIMA to LAD and SVG to OM- occluded RCA on cath, left to right and right to right collaterals  6/23/16 echo EF 40%- anteroseptal/apical akinesis        Last Assessment & Plan:   Formatting of this note might be different from the original.  Significant history of CAD, previous MIs, resultant ischemic cardiomyopathy. Status post previous CABG. Status post subcutaneous ICD in 2018. Most recent TTE from July 2021 demonstrates moderately reduced LV function with an EF of 30 to 40%. Recommend aggressive risk factor modification, including abstinence from illicit drug use and tobacco use. Addiction medicine is following. Recommend goal-directed medical therapy, including aspirin and statin therapy. Recommend avoidance of beta-blockers for now given daily cocaine use and relative bradycardia. Given chronic chest pain, recommend low-dose Imdur. Formatting of this note might be different from the original.  Added automatically from request for surgery [id number]  Formatting of this note might be different from the original.  Added automatically from request for surgery [id number]    Last Assessment & Plan:   Formatting of this note is different from the original.  H/o CABG in 2016. Cardiac catheterization in 04/2017 with patent LIMA-LAD and SVG-OM, occluded native RCA, medically managed. Last stress test in 05/2018 with fixed anterior/anteroseptal and inferior/inferolateral perfusion defects. He has not been compliant with medications or follow up. Now having chest pain and dyspnea with minimal exertion, associated with diaphoresis, concerning for angina. He also had a reported syncopal episode. EKG with old anteroseptal and lateral infarct unchanged from prior. NSVT noted on telemetry today.     Recommendations:  -- Continue Aspirin and Statin  -- Started Carvedilol and Imdur  -- Will add Ranexa  -- Will consider evaluation of his grafts with cardiac catheterization vs coronary CTA. Will discuss with Dr. Archie Qureshi on Monday  --10/22-- review of catheterization films demonstrated occlusion of all his native vessels. The mammary artery to his anterior descending was widely patent with no significant disease. The saphenous vein graft to his circumflex was also widely patent with no significant disease. Given the recent performance of the study as well as his absence of any medical therapy I do not feel that repeat catheterization is necessary or indicated at this time. We will resume medical therapy and plan EP evaluation given his recent syncope and questionable nonsustained VT as well as his diminished ejection fraction. Last Assessment & Plan:   Formatting of this note might be different from the original.  Longstanding history of ischemic cardiomyopathy last cardiac catheterization was in April 2017 at which time he had patent LIMA to the LAD and vein graft to the marginal and EF was 40%. Most recent ischemic evaluation was on 11/20/2019 with a nuclear stress test which showed fixed apical defect with an EF 45%. He has a defibrillator implanted in 2018. He is sporadically compliant with medical therapy but is well compensated from a heart failure perspective. He does not have angina. Risk for orthopedic surgery is acceptable under the circumstances and I would proceed without additional evaluation. --reinstitute medical therapy:  --coreg 6.25mg bid  --lisinopril 5mg daily  --lipitor 80mg daily  --spironoloactone 12.5mg daily  5/6 update: Doing well post op. On appropriate GDMT for HF.  Cardio will sign off at this time, please call with any questions         Dyslipidemia 7/11/2022 Yes    Essential hypertension, benign 7/11/2022 Yes    Anxiety 7/11/2022 Yes    Chronic combined systolic and diastolic heart failure ([address] 7/11/2022 Yes    Overview Signed 7/11/2022  5:23 AM by BARIN Kay NP Formatting of this note might be different from the original.        Last Assessment & Plan:   Formatting of this note might be different from the original.  -CXR-negative  -Echo (2/2021) with EF of 16%, (7/2021) with EF of 30%,   -Most recent echo (10/31) with EF of 25-30%  -s/p ICD   -BNP-1,294 , baseline 1-2k  -SPECT (2/2021) with large fixed defect involving anterior, anterolateral, and inferolateral walls. Severe global hypokinesias with akinesis  -appears relatively euvolemic on exam, on RA, no edema noted   -Continue home GDMT as tolerated, on lisinopril         Claudication of both lower extremities ([address] 7/11/2022 Yes    Ascending aortic aneurysm ([address] 7/11/2022 Yes          Plan:        1. Stable angina, CAD status post CABG x2 - await cardiology evaluation. Continue aspirin, statin therapy. Continue Imdur and Aldactone with Toprol-XL. Lisinopril on hold due to hypotension. 2. Primary hypertension with hypotension this AM-holding Catapres this morning. Hold parameters placed on all medications. 3. Ascending AAA - discussing with vascular surgery. CT surgery evaluation versus intervention via vascular in the outpatient setting. 4. Labs reviewed and are essentially unremarkable  5.  Change to cardiac diet once intervention plans with cardiology are made    [facility], DO  7/12/2022  10:12 AM

## 2022-07-12 NOTE — PROGRESS NOTES
Dr Jeffery Story notified through [facility] of new consult. Dr Jeffery Story will see patient 7/13.

## 2022-07-12 NOTE — PROGRESS NOTES
tablet, Take 1 tablet by mouth daily  metoprolol succinate (TOPROL XL) 25 MG extended release tablet, Take 1 tablet by mouth daily  ASPIRIN LOW DOSE 81 MG chewable tablet, Take 81 mg by mouth daily  atorvastatin (LIPITOR) 80 MG tablet, Take 80 mg by mouth nightly  cloNIDine (CATAPRES) 0.1 MG tablet, Take 0.1 mg by mouth in the morning, at noon, and at bedtime  lisinopril (PRINIVIL;ZESTRIL) 2.5 MG tablet, Take 2.5 mg by mouth daily  spironolactone (ALDACTONE) 25 MG tablet, Take 25 mg by mouth daily  diazePAM (VALIUM PO), Take 10 mg by mouth 2 times daily

## 2022-07-12 NOTE — CONSULTS
[address], [facility]  [phone]          Progress Note    Patient Name:  Marcial Henry    :  1970 5:23 PM      SUBJECTIVE       Mr. Nicko Cordero  has no chest pain, shortness of breath, palpitations, nausea or vomiting  And states chest pain is somewhat improved. OBJECTIVE     Vital signs:    /69   Pulse 66   Temp 97.6 °F (36.4 °C) (Temporal)   Resp 19   Ht 5' 9\" (1.753 m)   Wt 165 lb (74.8 kg)   SpO2 100%   BMI 24.37 kg/m²  2 L/min  .tro    Admit Weight:  195 lb (88.5 kg)    Last 3 weights: Wt Readings from Last 3 Encounters:   22 165 lb (74.8 kg)   22 202 lb (91.6 kg)   22 202 lb (91.6 kg)       BMI: Body mass index is 24.37 kg/m². Input/Output:       Intake/Output Summary (Last 24 hours) at 2022 1723  Last data filed at 2022 1544  Gross per 24 hour   Intake 480 ml   Output 2200 ml   Net -1720 ml       Date 22 0000 - 22 2359   Shift 9235-1533 4902-5038 4926-4525 24 Hour Total   INTAKE   Shift Total(mL/kg)       OUTPUT   Urine(mL/kg/hr) 800(1.3) 800(1.3)  1600   Shift Total(mL/kg) 800(10.7) 800(10.7)  1600(21.4)   Weight (kg) 74.8 74.8 74.8 74.8     Exam:     General appearance: awake and alert moves all ext   Lungs: no rhonchi, no wheezes, no rales  Heart: S1 and S2 no murmur  Abdomen: positive bowel sounds, no bruits, no masses  Extremities: warm and dry, no cyanosis, no clubbing        Laboratory Studies:     CBC:   Recent Labs     07/10/22  1946 22  0116 22  0454   WBC 10.1 8.8 6.9   HGB 15.8 14.3 13.5   HCT 46.6 42.7 42.0   MCV 90.7 93.0 94.8    249 210     BMP:   Recent Labs     07/10/22  1946 22  0116 22  0454     --  136   K 3.9 4.2 4.2     --  101   CO2 24  --  28   BUN 16  --  11   CREATININE 1.20  --  1.04     PT/INR: No results for input(s): PROTIME, INR in the last 72 hours. APTT: No results for input(s):  APTT in the last 72 hours.  MAG:   Recent Labs     22  011   MG 2.0     D Dimer: No results for input(s): DDIMER in the last 72 hours. Troponin    Recent Labs     07/10/22  1946 07/10/22  2214 22  0116   TROPHS 15 16 17                BNP No results for input(s): BNP in the last 72 hours. Recent Labs     07/10/22  1946   PROBNP 497*         Pulse Ox: SpO2  Av %  Min: 96 %  Max: 100 %  Supplemental O2: O2 Flow Rate (L/min): 2 L/min     Current Meds:    ranolazine  500 mg Oral BID    [START ON 2022] isosorbide mononitrate  120 mg Oral Daily    aspirin  81 mg Oral Daily    buPROPion  100 mg Oral TID    cloNIDine  0.1 mg Oral TID    [Held by provider] lisinopril  2.5 mg Oral Daily    metoprolol succinate  25 mg Oral Daily    spironolactone  25 mg Oral Daily    sodium chloride flush  5-40 mL IntraVENous 2 times per day    atorvastatin  80 mg Oral Nightly    enoxaparin  40 mg SubCUTAneous Daily     Continuous Infusions:    sodium chloride           XR CERVICAL SPINE (2-3 VIEWS)    Result Date: 2022  No acute lumbar spine abnormality. XR LUMBAR SPINE (2-3 VIEWS)    Result Date: 2022  No acute findings or significant degenerative change. XR CHEST PORTABLE    Result Date: 2022  No acute cardiopulmonary abnormality identified. XR CHEST 1 VIEW    Result Date: 2022  No evidence of acute process. CTA CHEST ABDOMEN PELVIS W CONTRAST    Result Date: 7/10/2022  Aneurysmal distension of ascending thoracic aorta measuring 5.4 x 4.8 cm in comparison to the normal diameter of the descending thoracic aorta measuring 2.5 x 2.6 cm. No acute pathology within the chest abdomen and pelvis. No dissection. No acute intimal injury. No pulmonary embolism. There are scattered areas of atherosclerotic calcification within the internal iliac arterial branches resulting in focal areas of mild to moderate stenosis.  RECOMMENDATIONS: Unavailable       Echo:      ASSESSMENT     Principal Problem: Coronary artery disease of native artery of native heart with stable angina pectoris (HCC)  Active Problems:    Dyslipidemia    Essential hypertension, benign    Anxiety    Chronic combined systolic and diastolic heart failure (HCC)    Claudication of both lower extremities (HCC)    Ascending aortic aneurysm (HCC)  Resolved Problems:    * No resolved hospital problems. *      PLAN         Reviewed cardiac catheterization films from June 15th. No intervenable lesions were seen. Will continue with medical management. I have increased his isosorbide mononitrate and will add Ranexa. Other things to consider would be addition of Farxiga and titration of his beta-blocker and could also consider adding calcium channel blocker as an antianginal and would likely discontinue clonidine. Cardiothoracic surgery to see regarding ascending thoracic aortic aneurysm.     Electronically signed by Jenelle Marino MD on 7/12/2022 at 5:23 PM

## 2022-07-12 NOTE — PLAN OF CARE
Problem: Discharge Planning  Goal: Discharge to home or other facility with appropriate resources  Outcome: Progressing  Flowsheets (Taken 7/12/2022 0800)  Discharge to home or other facility with appropriate resources: Identify barriers to discharge with patient and caregiver     Problem: Safety - Adult  Goal: Free from fall injury  Outcome: Progressing     Problem: ABCDS Injury Assessment  Goal: Absence of physical injury  Outcome: Progressing     Problem: Pain  Goal: Verbalizes/displays adequate comfort level or baseline comfort level  Outcome: Progressing     Problem: Nutrition Deficit:  Goal: Optimize nutritional status  Outcome: Progressing

## 2022-07-12 NOTE — PROGRESS NOTES
VASCULAR SURGERY PROGRESS NOTE      Interval History:    Mr. Geo Maharaj reports persistent chest pain, but no rest pain affecting the lower extremities. White blood cell count 6.9 (previously 8.8, 10.1) hemoglobin 13.5 (previously 14.3, 15.8 g/dL). Blood pressure has been somewhat labile, heart rate 64-67 over the past 24 hours.     Objective:     Current Medications   aspirin  81 mg Oral Daily    buPROPion  100 mg Oral TID    cloNIDine  0.1 mg Oral TID    [Held by provider] lisinopril  2.5 mg Oral Daily    metoprolol succinate  25 mg Oral Daily    spironolactone  25 mg Oral Daily    sodium chloride flush  5-40 mL IntraVENous 2 times per day    atorvastatin  80 mg Oral Nightly    enoxaparin  40 mg SubCUTAneous Daily    isosorbide mononitrate  60 mg Oral Daily        Allergies  Allergies   Allergen Reactions    Morphine Itching        Labs  Recent Labs     07/12/22  0454 07/11/22  0116 07/10/22  1946   WBC 6.9 8.8 10.1   HGB 13.5 14.3 15.8   HCT 42.0 42.7 46.6   MCV 94.8 93.0 90.7    249 276     Lab Results   Component Value Date/Time     07/12/2022 04:54 AM    K 4.2 07/12/2022 04:54 AM     07/12/2022 04:54 AM    CO2 28 07/12/2022 04:54 AM    BUN 11 07/12/2022 04:54 AM    CREATININE 1.04 07/12/2022 04:54 AM    GLUCOSE 128 07/12/2022 04:54 AM    CALCIUM 8.4 07/12/2022 04:54 AM      No results found for: CKTOTAL, CKMB, CKMBINDEX, TROPONINI  Lab Results   Component Value Date    ALT 92 (H) 07/05/2022    AST 62 (H) 07/05/2022    ALKPHOS 76 07/05/2022    BILITOT 0.45 07/05/2022      Imaging    KENY/PVR, 7/11/2022    Summary        Right KENY of 1.11 and left KENY of 0.97 are within normal limits.        Signature        ----------------------------------------------------------------    Electronically signed by Armando WashingtonSonographer) on    07/11/2022 03:00 PM    ----------------------------------------------------------------        ---------------------------------------------------------------- +---------++--------+-----+---------------++--------+-----+----------------+   ! Great Toe!!110     !0.92 !               !!105     !0.88 !                !   +---------++--------+-----+---------------++--------+-----+----------------+          I/O  I/O last 3 completed shifts: In: 960 [P.O.:960]  Out: 2750 [Urine:2750]  No intake/output data recorded. Vitals  Vitals:    07/12/22 0905   BP:    Pulse:    Resp:    Temp:    SpO2: 98%        Physical Examination  General: Uncomfortable appearing, alert, pleasant and appropriate in conversation. Eyes: PERRL, EOMI, no scleral icterus or conjunctivitis  HENT: No facial droop noted, no bleeding or discharge from the ears or nares. CV: RRR at this time. Popliteal: 2+ bilaterally              Dorsalis Pedis: 2+ bilaterally              Posterior Tibial: 2+ bilaterally  Respiratory: No respiratory distress, effort normal  Abdominal: Normoactive bowel sounds, no abdominal bruit noted. Soft, NTND abdomen with no pulsatile masses or peritoneal signs. Extremities: Able to move all extremities. No edema at this time. Skin: No pallor, cyanosis or ulcerations. Psych: Appropriate in conversation    Assessment:    Encounter to discuss test results  Ambulatory lower extremity pain    Plan:    Mr. Russell Abraham was evaluated in conjunction with Dr. Dex Zhong. We recommend cardiothoracic surgery consultation for ascending aortic aneurysm as our service does not perform interventions against ascending aneurysmal disease. From our perspective, we will obtain lower extremity arterial duplexes to evaluate for popliteal artery aneurysms. The KENY/PVR studies were essentially normal, and DP/PT pulses are palpable. I do not anticipate vascular surgical intervention, and a plan for outpatient follow up has been discussed to establish routine surveillance imaging if needed. We will follow up on the arterial duplexes and make further recommendations after the testing has been completed. Thank you.     Oni Murphy PA-C  [facility] [place]  Daytime office/After-hours call #: [phone]  Pager: [phone]

## 2022-07-13 LAB
ANION GAP SERPL CALCULATED.3IONS-SCNC: 4 MMOL/L (ref 9–17)
BUN BLDV-MCNC: 18 MG/DL (ref 6–20)
BUN/CREAT BLD: 13 (ref 9–20)
CALCIUM SERPL-MCNC: 8.4 MG/DL (ref 8.6–10.4)
CHLORIDE BLD-SCNC: 101 MMOL/L (ref 98–107)
CO2: 33 MMOL/L (ref 20–31)
CREAT SERPL-MCNC: 1.34 MG/DL (ref 0.7–1.2)
EKG ATRIAL RATE: 74 BPM
EKG P AXIS: 72 DEGREES
EKG P-R INTERVAL: 184 MS
EKG Q-T INTERVAL: 438 MS
EKG QRS DURATION: 114 MS
EKG QTC CALCULATION (BAZETT): 486 MS
EKG R AXIS: 98 DEGREES
EKG T AXIS: -38 DEGREES
EKG VENTRICULAR RATE: 74 BPM
GFR AFRICAN AMERICAN: >60 ML/MIN
GFR NON-AFRICAN AMERICAN: 56 ML/MIN
GFR SERPL CREATININE-BSD FRML MDRD: ABNORMAL ML/MIN/{1.73_M2}
GLUCOSE BLD-MCNC: 100 MG/DL (ref 70–99)
GLUCOSE BLD-MCNC: 101 MG/DL (ref 75–110)
GLUCOSE BLD-MCNC: 104 MG/DL (ref 75–110)
POTASSIUM SERPL-SCNC: 4.3 MMOL/L (ref 3.7–5.3)
SODIUM BLD-SCNC: 138 MMOL/L (ref 135–144)

## 2022-07-13 PROCEDURE — G0378 HOSPITAL OBSERVATION PER HR: HCPCS

## 2022-07-13 PROCEDURE — 80048 BASIC METABOLIC PNL TOTAL CA: CPT

## 2022-07-13 PROCEDURE — 94761 N-INVAS EAR/PLS OXIMETRY MLT: CPT

## 2022-07-13 PROCEDURE — 6370000000 HC RX 637 (ALT 250 FOR IP): Performed by: INTERNAL MEDICINE

## 2022-07-13 PROCEDURE — 93005 ELECTROCARDIOGRAM TRACING: CPT | Performed by: INTERNAL MEDICINE

## 2022-07-13 PROCEDURE — 2700000000 HC OXYGEN THERAPY PER DAY

## 2022-07-13 PROCEDURE — 6370000000 HC RX 637 (ALT 250 FOR IP): Performed by: NURSE PRACTITIONER

## 2022-07-13 PROCEDURE — 36415 COLL VENOUS BLD VENIPUNCTURE: CPT

## 2022-07-13 PROCEDURE — 99233 SBSQ HOSP IP/OBS HIGH 50: CPT | Performed by: INTERNAL MEDICINE

## 2022-07-13 PROCEDURE — 93010 ELECTROCARDIOGRAM REPORT: CPT | Performed by: INTERNAL MEDICINE

## 2022-07-13 PROCEDURE — 96372 THER/PROPH/DIAG INJ SC/IM: CPT

## 2022-07-13 PROCEDURE — 82947 ASSAY GLUCOSE BLOOD QUANT: CPT

## 2022-07-13 PROCEDURE — 2580000003 HC RX 258: Performed by: NURSE PRACTITIONER

## 2022-07-13 PROCEDURE — 96376 TX/PRO/DX INJ SAME DRUG ADON: CPT

## 2022-07-13 PROCEDURE — 6360000002 HC RX W HCPCS: Performed by: NURSE PRACTITIONER

## 2022-07-13 PROCEDURE — 2060000000 HC ICU INTERMEDIATE R&B

## 2022-07-13 RX ORDER — OXYCODONE HYDROCHLORIDE 5 MG/1
5 TABLET ORAL EVERY 4 HOURS PRN
Status: DISCONTINUED | OUTPATIENT
Start: 2022-07-13 | End: 2022-07-14 | Stop reason: HOSPADM

## 2022-07-13 RX ORDER — CLONIDINE HYDROCHLORIDE 0.1 MG/1
0.1 TABLET ORAL DAILY
Status: DISCONTINUED | OUTPATIENT
Start: 2022-07-14 | End: 2022-07-13

## 2022-07-13 RX ORDER — CLONIDINE HYDROCHLORIDE 0.1 MG/1
0.1 TABLET ORAL EVERY 4 HOURS PRN
Status: DISCONTINUED | OUTPATIENT
Start: 2022-07-13 | End: 2022-07-14 | Stop reason: HOSPADM

## 2022-07-13 RX ORDER — METOPROLOL SUCCINATE 50 MG/1
50 TABLET, EXTENDED RELEASE ORAL DAILY
Status: DISCONTINUED | OUTPATIENT
Start: 2022-07-14 | End: 2022-07-14 | Stop reason: HOSPADM

## 2022-07-13 RX ORDER — DEXTROSE MONOHYDRATE 50 MG/ML
100 INJECTION, SOLUTION INTRAVENOUS PRN
Status: DISCONTINUED | OUTPATIENT
Start: 2022-07-13 | End: 2022-07-14 | Stop reason: HOSPADM

## 2022-07-13 RX ADMIN — DIAZEPAM 5 MG: 5 TABLET ORAL at 07:14

## 2022-07-13 RX ADMIN — OXYCODONE 5 MG: 5 TABLET ORAL at 17:47

## 2022-07-13 RX ADMIN — ONDANSETRON 4 MG: 2 INJECTION INTRAMUSCULAR; INTRAVENOUS at 02:04

## 2022-07-13 RX ADMIN — HYDROMORPHONE HYDROCHLORIDE 0.5 MG: 1 INJECTION, SOLUTION INTRAMUSCULAR; INTRAVENOUS; SUBCUTANEOUS at 07:14

## 2022-07-13 RX ADMIN — ASPIRIN 81 MG CHEWABLE TABLET 81 MG: 81 TABLET CHEWABLE at 09:20

## 2022-07-13 RX ADMIN — SODIUM CHLORIDE, PRESERVATIVE FREE 10 ML: 5 INJECTION INTRAVENOUS at 20:09

## 2022-07-13 RX ADMIN — ISOSORBIDE MONONITRATE 120 MG: 60 TABLET, EXTENDED RELEASE ORAL at 09:20

## 2022-07-13 RX ADMIN — HYDROMORPHONE HYDROCHLORIDE 0.5 MG: 1 INJECTION, SOLUTION INTRAMUSCULAR; INTRAVENOUS; SUBCUTANEOUS at 00:47

## 2022-07-13 RX ADMIN — RANOLAZINE 500 MG: 500 TABLET, FILM COATED, EXTENDED RELEASE ORAL at 09:20

## 2022-07-13 RX ADMIN — HYDROMORPHONE HYDROCHLORIDE 0.5 MG: 1 INJECTION, SOLUTION INTRAMUSCULAR; INTRAVENOUS; SUBCUTANEOUS at 12:45

## 2022-07-13 RX ADMIN — BUPROPION HYDROCHLORIDE 100 MG: 100 TABLET, FILM COATED ORAL at 09:20

## 2022-07-13 RX ADMIN — METOPROLOL SUCCINATE 25 MG: 25 TABLET, EXTENDED RELEASE ORAL at 12:48

## 2022-07-13 RX ADMIN — EMPAGLIFLOZIN 10 MG: 10 TABLET, FILM COATED ORAL at 17:48

## 2022-07-13 RX ADMIN — BUPROPION HYDROCHLORIDE 100 MG: 100 TABLET, FILM COATED ORAL at 15:52

## 2022-07-13 RX ADMIN — ATORVASTATIN CALCIUM 80 MG: 80 TABLET, FILM COATED ORAL at 20:07

## 2022-07-13 RX ADMIN — SPIRONOLACTONE 25 MG: 25 TABLET ORAL at 09:20

## 2022-07-13 RX ADMIN — ENOXAPARIN SODIUM 40 MG: 100 INJECTION SUBCUTANEOUS at 09:20

## 2022-07-13 RX ADMIN — OXYCODONE 5 MG: 5 TABLET ORAL at 22:24

## 2022-07-13 RX ADMIN — DIAZEPAM 5 MG: 5 TABLET ORAL at 15:52

## 2022-07-13 RX ADMIN — SODIUM CHLORIDE, PRESERVATIVE FREE 10 ML: 5 INJECTION INTRAVENOUS at 08:00

## 2022-07-13 RX ADMIN — BUPROPION HYDROCHLORIDE 100 MG: 100 TABLET, FILM COATED ORAL at 20:07

## 2022-07-13 RX ADMIN — RANOLAZINE 500 MG: 500 TABLET, FILM COATED, EXTENDED RELEASE ORAL at 20:07

## 2022-07-13 RX ADMIN — SODIUM CHLORIDE, PRESERVATIVE FREE 10 ML: 5 INJECTION INTRAVENOUS at 07:59

## 2022-07-13 ASSESSMENT — PAIN DESCRIPTION - PAIN TYPE
TYPE: CHRONIC PAIN
TYPE: CHRONIC PAIN

## 2022-07-13 ASSESSMENT — PAIN DESCRIPTION - LOCATION
LOCATION: CHEST
LOCATION: BACK;CHEST
LOCATION: CHEST;NECK
LOCATION: CHEST
LOCATION: CHEST
LOCATION: BACK;CHEST

## 2022-07-13 ASSESSMENT — PAIN DESCRIPTION - ORIENTATION
ORIENTATION: ANTERIOR
ORIENTATION: MID

## 2022-07-13 ASSESSMENT — PAIN SCALES - GENERAL
PAINLEVEL_OUTOF10: 7
PAINLEVEL_OUTOF10: 10
PAINLEVEL_OUTOF10: 5
PAINLEVEL_OUTOF10: 7
PAINLEVEL_OUTOF10: 6

## 2022-07-13 ASSESSMENT — PAIN DESCRIPTION - DESCRIPTORS
DESCRIPTORS: PRESSURE
DESCRIPTORS: DISCOMFORT

## 2022-07-13 ASSESSMENT — PAIN - FUNCTIONAL ASSESSMENT
PAIN_FUNCTIONAL_ASSESSMENT: ACTIVITIES ARE NOT PREVENTED

## 2022-07-13 ASSESSMENT — PAIN DESCRIPTION - ONSET: ONSET: ON-GOING

## 2022-07-13 ASSESSMENT — PAIN DESCRIPTION - FREQUENCY: FREQUENCY: CONTINUOUS

## 2022-07-13 NOTE — PLAN OF CARE
Problem: Discharge Planning  Goal: Discharge to home or other facility with appropriate resources  7/13/2022 0136 by Wilbur Dakins, RN  Outcome: Progressing    Problem: Safety - Adult  Goal: Free from fall injury  7/13/2022 0136 by Wilbur Dakins, RN  Outcome: Progressing    Problem: ABCDS Injury Assessment  Goal: Absence of physical injury  7/13/2022 0136 by Wilbur Dakins, RN  Outcome: Progressing    Problem: Pain  Goal: Verbalizes/displays adequate comfort level or baseline comfort level  7/13/2022 0136 by Wilbur Dakins, RN  Outcome: Progressing    Problem: Nutrition Deficit:  Goal: Optimize nutritional status  7/13/2022 0136 by Wilbur Dakins, RN  Outcome: Progressing
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
## 2022-07-13 NOTE — PLAN OF CARE
Films reviewed with Dr. China Whalen. Measuring 4.9. Recommend repeat scan in 3 months, BP control.     WAYNE Cross

## 2022-07-13 NOTE — PROGRESS NOTES
[address],  [facility]  [phone]          Progress Note    Patient Name:  Daryl Distance    :  1970 5:21 PM      SUBJECTIVE       Mr. Timothy Wilson  has no shortness of breath, palpitations, nausea or vomiting. Still has intermittent chest pain which is being controlled with opioids. OBJECTIVE     Vital signs:    /70   Pulse 68   Temp 97.6 °F (36.4 °C) (Temporal)   Resp 23   Ht 5' 9\" (1.753 m)   Wt 165 lb (74.8 kg)   SpO2 98%   BMI 24.37 kg/m²  2 L/min  .tro    Admit Weight:  195 lb (88.5 kg)    Last 3 weights: Wt Readings from Last 3 Encounters:   22 165 lb (74.8 kg)   22 202 lb (91.6 kg)   22 202 lb (91.6 kg)       BMI: Body mass index is 24.37 kg/m².     Input/Output:       Intake/Output Summary (Last 24 hours) at 2022 1721  Last data filed at 2022 1200  Gross per 24 hour   Intake --   Output 1500 ml   Net -1500 ml       Date 22 0000 - 22 2359   Shift 3725-7979 1511-5562 5051-9750 24 Hour Total   INTAKE   Shift Total(mL/kg)       OUTPUT   Urine(mL/kg/hr)  1500(2.5)  1500   Shift Total(mL/kg)  1500(20)  1500(20)   Weight (kg) 74.8 74.8 74.8 74.8     Exam:     General appearance: awake and alert moves all ext   Lungs: no rhonchi, no wheezes, no rales  Heart: S1 and S2 no murmur  Abdomen: positive bowel sounds, no bruits, no masses  Extremities: warm and dry, no cyanosis, no clubbing        Laboratory Studies:     CBC:   Recent Labs     07/10/22  1946 07/11/22  0116 22  0454   WBC 10.1 8.8 6.9   HGB 15.8 14.3 13.5   HCT 46.6 42.7 42.0   MCV 90.7 93.0 94.8    249 210     BMP:   Recent Labs     07/10/22  1946 07/10/22  1946 07/11/22  0116 22  0454 22  0500     --   --  136 138   K 3.9   < > 4.2 4.2 4.3     --   --  101 101   CO2 24  --   --  28 33*   BUN 16  --   --  11 18   CREATININE 1.20  --   --  1.04 1.34*    < > = values in this interval not displayed. PT/INR: No results for input(s): PROTIME, INR in the last 72 hours. APTT: No results for input(s): APTT in the last 72 hours. MAG:   Recent Labs     22  011   MG 2.0     D Dimer: No results for input(s): DDIMER in the last 72 hours. Troponin    Recent Labs     07/10/22  1946 07/10/22  2214 22  011   TROPHS 15 16 17                BNP No results for input(s): BNP in the last 72 hours. Recent Labs     07/10/22  1946   PROBNP 497*         Pulse Ox: SpO2  Av.1 %  Min: 92 %  Max: 100 %  Supplemental O2: O2 Flow Rate (L/min): 2 L/min     Current Meds:    empagliflozin  10 mg Oral Daily    [START ON 2022] metoprolol succinate  50 mg Oral Daily    ranolazine  500 mg Oral BID    isosorbide mononitrate  120 mg Oral Daily    aspirin  81 mg Oral Daily    buPROPion  100 mg Oral TID    [Held by provider] lisinopril  2.5 mg Oral Daily    spironolactone  25 mg Oral Daily    sodium chloride flush  5-40 mL IntraVENous 2 times per day    atorvastatin  80 mg Oral Nightly    enoxaparin  40 mg SubCUTAneous Daily     Continuous Infusions:    sodium chloride           XR CERVICAL SPINE (2-3 VIEWS)    Result Date: 2022  No acute lumbar spine abnormality. XR LUMBAR SPINE (2-3 VIEWS)    Result Date: 2022  No acute findings or significant degenerative change. XR CHEST PORTABLE    Result Date: 2022  No acute cardiopulmonary abnormality identified. CTA CHEST ABDOMEN PELVIS W CONTRAST    Result Date: 7/10/2022  Aneurysmal distension of ascending thoracic aorta measuring 5.4 x 4.8 cm in comparison to the normal diameter of the descending thoracic aorta measuring 2.5 x 2.6 cm. No acute pathology within the chest abdomen and pelvis. No dissection. No acute intimal injury. No pulmonary embolism. There are scattered areas of atherosclerotic calcification within the internal iliac arterial branches resulting in focal areas of mild to moderate stenosis. RECOMMENDATIONS: Unavailable       Echo:      ASSESSMENT     Principal Problem:    Coronary artery disease of native artery of native heart with stable angina pectoris (HCC)  Active Problems:    Dyslipidemia    Essential hypertension, benign    Anxiety    Chronic combined systolic and diastolic heart failure (HCC)    Claudication of both lower extremities (HCC)    Ascending aortic aneurysm (HCC)  Resolved Problems:    * No resolved hospital problems. *      PLAN       Will need further medical management regarding heart failure. I think titrating his beta-blocker to get a better beta blockade effect given his aneurysm would be the best bet here. I will change his clonidine to p.r.n. to give us a bit more blood pressure to work with. Will continue to follow closely with you await CT surgery consultation and opinion.       Electronically signed by Mario Lovelace MD on 7/13/2022 at 5:21 PM

## 2022-07-13 NOTE — PROGRESS NOTES
[facility]  Office: [address], DO, Bronwyn Valenzuela, DO, Thelma Pina, DO, Miley Raymundo Blood, DO, Claribel Stanley MD, Rustam Garcia MD, Heidi Allen MD, Claudean Millard, MD, Sherly Reinoso MD, Silvia Mejias MD, Mariah Rapp MD, Melissa Arroyo, DO, Marichuy Leon MD,  Mariana De La Vega DO, Altagracia Valente MD, Lucía Person MD, Verla Kawasaki, DO, Kayden Juarez MD, Sheri Ramirez MD, Matthew Koenig, DO, Sal Garrido MD, Nathan Lopes MD, Ca Turcios [facility], [facility], CNP, Daisy Freed, CNP, Morales Newton, CNP, Jax Camarillo, CNP, Keli Delgado, CNP, KRISTI FragosoC, Cornelio Damon, [facility], Verito Yen, CNP, Iza De, CNP, Luiza Coyle, CNP, Kanika Vieyra, CNS, Jose Eduardo Lazaro, [facility], Spenser Aguilar, CNP, Tim Saint, CNP, Pj Larry, [facility]    Progress Note    7/13/2022    3:42 PM    Name:   Arely Siegel  MRN:     [id number]     [place]:      [de-identified]   Room:   2035/2035[facility] Day:  3  Admit Date:  7/10/2022  7:44 PM    PCP:   No primary care provider on file. Code Status:  Full Code    Subjective:     C/C:   Chief Complaint   Patient presents with    Chest Pain    Nausea    Shortness of Breath     Interval History Status: not changed. Patient seen and examined this afternoon. No acute events overnight. Patient quite tearful in regards to his recurrent chest pains. States that he was previously on Ranexa, which was started inpatient, and this did not relieve his pains. Uncertain as to whether or not Imdur improved his pains. He is wanting increasing doses of narcotics. Very tearful. Vitals have remained stable. Brief History:     Arely Siegel is a 65-year-old male who presents to [facility] on 7/10/2022 for evaluation of ongoing chest pain. He has a history of CABG x2 in 2016 in [place]. He also has an AICD present.   Associated with his chest pain and shortness of breath and diaphoresis. He does also report that he has had claudication symptoms affecting the bilateral lower extremities when he is walking as well as occasional numbness of the legs and sometimes the hands. Review of Systems:     Constitutional:  negative for chills, fevers, sweats  Respiratory: Reports occasional dyspnea; negative for cough, wheezing  Cardiovascular: Reports chest pains intermittently; negative for lower extremity edema, palpitations  Gastrointestinal:  negative for abdominal pain, constipation, diarrhea, nausea, vomiting  Neurological:  negative for dizziness, headache  Musculoskeletal: Reports lower extremity burning/tingling with ambulation    Medications: Allergies: Allergies   Allergen Reactions    Morphine Itching       Current Meds:   Scheduled Meds:    ranolazine  500 mg Oral BID    isosorbide mononitrate  120 mg Oral Daily    aspirin  81 mg Oral Daily    buPROPion  100 mg Oral TID    cloNIDine  0.1 mg Oral TID    [Held by provider] lisinopril  2.5 mg Oral Daily    metoprolol succinate  25 mg Oral Daily    spironolactone  25 mg Oral Daily    sodium chloride flush  5-40 mL IntraVENous 2 times per day    atorvastatin  80 mg Oral Nightly    enoxaparin  40 mg SubCUTAneous Daily     Continuous Infusions:    sodium chloride       PRN Meds: HYDROmorphone, sodium chloride flush, sodium chloride, ondansetron **OR** ondansetron, acetaminophen **OR** acetaminophen, polyethylene glycol, nitroGLYCERIN, diazePAM    Data:     Past Medical History:   has a past medical history of AA (alcohol abuse), Coronary artery disease involving native coronary artery of native heart, Drug abuse ([facility] [address] Dyslipidemia, Hepatitis, and Primary hypertension. Social History:   reports that he has never smoked. He has never used smokeless tobacco. He reports previous alcohol use.      Family History:   Family History   Family history unknown: Yes       Vitals:  BP (!) 123/95   Pulse 68   Temp 97.6 °F (36.4 °C) (Temporal)   Resp 16   Ht 5' 9\" (1.753 m)   Wt 165 lb (74.8 kg)   SpO2 99%   BMI 24.37 kg/m²   Temp (24hrs), Av.6 °F (36.4 °C), Min:97 °F (36.1 °C), Max:98.2 °F (36.8 °C)    No results for input(s): POCGLU in the last 72 hours. I/O (24Hr): Intake/Output Summary (Last 24 hours) at 2022 1542  Last data filed at 2022 1200  Gross per 24 hour   Intake --   Output 2300 ml   Net -2300 ml       Labs:  Hematology:  Recent Labs     07/10/22  1946 07/11/22  0116 07/12/22  0454   WBC 10.1 8.8 6.9   RBC 5.14 4.59 4.43   HGB 15.8 14.3 13.5   HCT 46.6 42.7 42.0   MCV 90.7 93.0 94.8   MCH 30.7 31.2 30.5   MCHC 33.9 33.5 32.1   RDW 14.7* 15.1* 15.1*    249 210   MPV 9.4 9.5 9.5     Chemistry:  Recent Labs     07/10/22  1946 07/10/22  1946 07/10/22  2214 07/11/22  0116 07/12/22  0454 22  0500     --   --   --  136 138   K 3.9   < >  --  4.2 4.2 4.3     --   --   --  101 101   CO2 24  --   --   --  28 33*   GLUCOSE 110*  --   --   --  128* 100*   BUN 16  --   --   --  11 18   CREATININE 1.20  --   --   --  1.04 1.34*   MG  --   --   --  2.0  --   --    ANIONGAP 12  --   --   --  7* 4*   LABGLOM >60  --   --   --  >60 56*   GFRAA >60  --   --   --  >60 >60   CALCIUM 9.4  --   --   --  8.4* 8.4*   PROBNP 497*  --   --   --   --   --    TROPHS 15  --  16 17  --   --     < > = values in this interval not displayed. No results for input(s): PROT, LABALBU, LABA1C, O4UKTME, X8GXZZG, FT4, TSH, AST, ALT, LDH, GGT, ALKPHOS, LABGGT, BILITOT, BILIDIR, AMMONIA, AMYLASE, LIPASE, LACTATE, CHOL, HDL, LDLCHOLESTEROL, CHOLHDLRATIO, TRIG, VLDL, UTI56MI, PHENYTOIN, PHENYF, URICACID, POCGLU in the last 72 hours.   ABG:No results found for: POCPH, PHART, PH, POCPCO2, KXK2OQL, PCO2, POCPO2, PO2ART, PO2, POCHCO3, ZIW5JNU, HCO3, NBEA, PBEA, BEART, BE, THGBART, THB, TFW7HBH, JSPI1MJG, W9HSDOLI, O2SAT, FIO2  No results found for: SPECIAL  No results found for: Signed 7/11/2022  5:23 AM by BRAIN Osman NP     Formatting of this note might be different from the original.  5/2016 CABG including LIMA to LAD and SVG to OM- occluded RCA on cath, left to right and right to right collaterals  6/23/16 echo EF 40%- anteroseptal/apical akinesis        Last Assessment & Plan:   Formatting of this note might be different from the original.  Significant history of CAD, previous MIs, resultant ischemic cardiomyopathy. Status post previous CABG. Status post subcutaneous ICD in 2018. Most recent TTE from July 2021 demonstrates moderately reduced LV function with an EF of 30 to 40%. Recommend aggressive risk factor modification, including abstinence from illicit drug use and tobacco use. Addiction medicine is following. Recommend goal-directed medical therapy, including aspirin and statin therapy. Recommend avoidance of beta-blockers for now given daily cocaine use and relative bradycardia. Given chronic chest pain, recommend low-dose Imdur. Formatting of this note might be different from the original.  Added automatically from request for surgery [id number]  Formatting of this note might be different from the original.  Added automatically from request for surgery [id number]    Last Assessment & Plan:   Formatting of this note is different from the original.  H/o CABG in 2016. Cardiac catheterization in 04/2017 with patent LIMA-LAD and SVG-OM, occluded native RCA, medically managed. Last stress test in 05/2018 with fixed anterior/anteroseptal and inferior/inferolateral perfusion defects. He has not been compliant with medications or follow up. Now having chest pain and dyspnea with minimal exertion, associated with diaphoresis, concerning for angina. He also had a reported syncopal episode. EKG with old anteroseptal and lateral infarct unchanged from prior. NSVT noted on telemetry today.     Recommendations:  -- Continue Aspirin and Statin  -- Started Carvedilol and Imdur  -- Will add Ranexa  -- Will consider evaluation of his grafts with cardiac catheterization vs coronary CTA. Will discuss with Dr. Hugo Spencer on Monday  --10/22-- review of catheterization films demonstrated occlusion of all his native vessels. The mammary artery to his anterior descending was widely patent with no significant disease. The saphenous vein graft to his circumflex was also widely patent with no significant disease. Given the recent performance of the study as well as his absence of any medical therapy I do not feel that repeat catheterization is necessary or indicated at this time. We will resume medical therapy and plan EP evaluation given his recent syncope and questionable nonsustained VT as well as his diminished ejection fraction. Last Assessment & Plan:   Formatting of this note might be different from the original.  Longstanding history of ischemic cardiomyopathy last cardiac catheterization was in April 2017 at which time he had patent LIMA to the LAD and vein graft to the marginal and EF was 40%. Most recent ischemic evaluation was on 11/20/2019 with a nuclear stress test which showed fixed apical defect with an EF 45%. He has a defibrillator implanted in 2018. He is sporadically compliant with medical therapy but is well compensated from a heart failure perspective. He does not have angina. Risk for orthopedic surgery is acceptable under the circumstances and I would proceed without additional evaluation. --reinstitute medical therapy:  --coreg 6.25mg bid  --lisinopril 5mg daily  --lipitor 80mg daily  --spironoloactone 12.5mg daily  5/6 update: Doing well post op. On appropriate GDMT for HF.  Cardio will sign off at this time, please call with any questions         Dyslipidemia 7/11/2022 Yes    Essential hypertension, benign 7/11/2022 Yes    Anxiety 7/11/2022 Yes    Chronic combined systolic and diastolic heart failure ([address] 7/11/2022 Yes    Overview Signed 7/11/2022  5:23 AM by BRAIN Lamar - NP     Formatting of this note might be different from the original.        Last Assessment & Plan:   Formatting of this note might be different from the original.  -CXR-negative  -Echo (2/2021) with EF of 16%, (7/2021) with EF of 30%,   -Most recent echo (10/31) with EF of 25-30%  -s/p ICD   -BNP-1,294 , baseline 1-2k  -SPECT (2/2021) with large fixed defect involving anterior, anterolateral, and inferolateral walls. Severe global hypokinesias with akinesis  -appears relatively euvolemic on exam, on RA, no edema noted   -Continue home GDMT as tolerated, on lisinopril         Claudication of both lower extremities ([address] 7/11/2022 Yes    Ascending aortic aneurysm ([address] 7/11/2022 Yes          Plan:        1. Stable angina, CAD status post CABG x2 - appreciate cardiology evaluation. Continue aspirin, statin therapy. Dose of Imdur increased. Ranexa added. Continue Aldactone and Toprol-XL. They are considering adding calcium channel blocker as antianginal and stopping clonidine. They are also recommend starting Farxiga. 2. Primary hypertension with hypotension this AM-holding Catapres this morning. Hold parameters placed on all medications. 3. Ascending AAA - CT surgery eval noted - repeat scan in 3 months with BP control  4. Change to cardiac diet once intervention plans with cardiology are made  5. Leg pain/swelling-appreciate vascular surgery input. Following up in the outpatient setting for venous insufficiency  6. Attempt to wean Dilaudid. Adding Roxicodone. 7. Wean Catapres to daily to avoid rebound hypertension  8. Plan to reinitiate lisinopril or adding calcium channel blocker.     33 Jerri Pendleton DO  7/13/2022  3:42 PM

## 2022-07-14 VITALS
HEIGHT: 69 IN | HEART RATE: 68 BPM | DIASTOLIC BLOOD PRESSURE: 74 MMHG | SYSTOLIC BLOOD PRESSURE: 111 MMHG | TEMPERATURE: 98.6 F | BODY MASS INDEX: 24.44 KG/M2 | RESPIRATION RATE: 19 BRPM | WEIGHT: 165 LBS | OXYGEN SATURATION: 98 %

## 2022-07-14 LAB
ANION GAP SERPL CALCULATED.3IONS-SCNC: 9 MMOL/L (ref 9–17)
BUN BLDV-MCNC: 18 MG/DL (ref 6–20)
BUN/CREAT BLD: 14 (ref 9–20)
CALCIUM SERPL-MCNC: 8.9 MG/DL (ref 8.6–10.4)
CHLORIDE BLD-SCNC: 98 MMOL/L (ref 98–107)
CO2: 29 MMOL/L (ref 20–31)
CREAT SERPL-MCNC: 1.3 MG/DL (ref 0.7–1.2)
GFR AFRICAN AMERICAN: >60 ML/MIN
GFR NON-AFRICAN AMERICAN: 58 ML/MIN
GFR SERPL CREATININE-BSD FRML MDRD: ABNORMAL ML/MIN/{1.73_M2}
GLUCOSE BLD-MCNC: 100 MG/DL (ref 75–110)
GLUCOSE BLD-MCNC: 106 MG/DL (ref 75–110)
GLUCOSE BLD-MCNC: 108 MG/DL (ref 70–99)
POTASSIUM SERPL-SCNC: 4.2 MMOL/L (ref 3.7–5.3)
SODIUM BLD-SCNC: 136 MMOL/L (ref 135–144)

## 2022-07-14 PROCEDURE — 96376 TX/PRO/DX INJ SAME DRUG ADON: CPT

## 2022-07-14 PROCEDURE — 99239 HOSP IP/OBS DSCHRG MGMT >30: CPT | Performed by: INTERNAL MEDICINE

## 2022-07-14 PROCEDURE — G0378 HOSPITAL OBSERVATION PER HR: HCPCS

## 2022-07-14 PROCEDURE — 80048 BASIC METABOLIC PNL TOTAL CA: CPT

## 2022-07-14 PROCEDURE — 96372 THER/PROPH/DIAG INJ SC/IM: CPT

## 2022-07-14 PROCEDURE — 2580000003 HC RX 258: Performed by: NURSE PRACTITIONER

## 2022-07-14 PROCEDURE — 36415 COLL VENOUS BLD VENIPUNCTURE: CPT

## 2022-07-14 PROCEDURE — 6370000000 HC RX 637 (ALT 250 FOR IP): Performed by: INTERNAL MEDICINE

## 2022-07-14 PROCEDURE — 6370000000 HC RX 637 (ALT 250 FOR IP): Performed by: NURSE PRACTITIONER

## 2022-07-14 PROCEDURE — 6360000002 HC RX W HCPCS: Performed by: NURSE PRACTITIONER

## 2022-07-14 PROCEDURE — 82947 ASSAY GLUCOSE BLOOD QUANT: CPT

## 2022-07-14 PROCEDURE — 6360000002 HC RX W HCPCS: Performed by: INTERNAL MEDICINE

## 2022-07-14 RX ORDER — RANOLAZINE 500 MG/1
500 TABLET, EXTENDED RELEASE ORAL 2 TIMES DAILY
Qty: 60 TABLET | Refills: 0 | Status: SHIPPED | OUTPATIENT
Start: 2022-07-14

## 2022-07-14 RX ORDER — OXYCODONE HYDROCHLORIDE 5 MG/1
5 TABLET ORAL EVERY 6 HOURS PRN
Qty: 12 TABLET | Refills: 0 | Status: SHIPPED | OUTPATIENT
Start: 2022-07-14 | End: 2022-07-17

## 2022-07-14 RX ORDER — TAMSULOSIN HYDROCHLORIDE 0.4 MG/1
0.4 CAPSULE ORAL DAILY
Qty: 30 CAPSULE | Refills: 0 | Status: SHIPPED | OUTPATIENT
Start: 2022-07-14

## 2022-07-14 RX ORDER — BUPROPION HYDROCHLORIDE 100 MG/1
100 TABLET ORAL 3 TIMES DAILY
Qty: 90 TABLET | Refills: 0 | Status: SHIPPED | OUTPATIENT
Start: 2022-07-14

## 2022-07-14 RX ORDER — QUETIAPINE FUMARATE 50 MG/1
100 TABLET, FILM COATED ORAL NIGHTLY PRN
Qty: 30 TABLET | Refills: 3 | Status: SHIPPED | OUTPATIENT
Start: 2022-07-14

## 2022-07-14 RX ORDER — DIAZEPAM 10 MG/1
10 TABLET ORAL 2 TIMES DAILY
Qty: 10 TABLET | Refills: 0 | Status: SHIPPED | OUTPATIENT
Start: 2022-07-14 | End: 2022-07-19

## 2022-07-14 RX ORDER — ISOSORBIDE MONONITRATE 120 MG/1
120 TABLET, EXTENDED RELEASE ORAL DAILY
Qty: 30 TABLET | Refills: 0 | Status: SHIPPED | OUTPATIENT
Start: 2022-07-15

## 2022-07-14 RX ADMIN — RANOLAZINE 500 MG: 500 TABLET, FILM COATED, EXTENDED RELEASE ORAL at 08:20

## 2022-07-14 RX ADMIN — DIAZEPAM 5 MG: 5 TABLET ORAL at 00:36

## 2022-07-14 RX ADMIN — HYDROMORPHONE HYDROCHLORIDE 0.5 MG: 1 INJECTION, SOLUTION INTRAMUSCULAR; INTRAVENOUS; SUBCUTANEOUS at 00:36

## 2022-07-14 RX ADMIN — DIAZEPAM 5 MG: 5 TABLET ORAL at 08:27

## 2022-07-14 RX ADMIN — EMPAGLIFLOZIN 10 MG: 10 TABLET, FILM COATED ORAL at 08:21

## 2022-07-14 RX ADMIN — ASPIRIN 81 MG CHEWABLE TABLET 81 MG: 81 TABLET CHEWABLE at 08:20

## 2022-07-14 RX ADMIN — ISOSORBIDE MONONITRATE 120 MG: 60 TABLET, EXTENDED RELEASE ORAL at 08:20

## 2022-07-14 RX ADMIN — ENOXAPARIN SODIUM 40 MG: 100 INJECTION SUBCUTANEOUS at 08:20

## 2022-07-14 RX ADMIN — BUPROPION HYDROCHLORIDE 100 MG: 100 TABLET, FILM COATED ORAL at 08:21

## 2022-07-14 RX ADMIN — SPIRONOLACTONE 25 MG: 25 TABLET ORAL at 08:20

## 2022-07-14 RX ADMIN — METOPROLOL SUCCINATE 50 MG: 50 TABLET, EXTENDED RELEASE ORAL at 08:20

## 2022-07-14 RX ADMIN — ONDANSETRON 4 MG: 2 INJECTION INTRAMUSCULAR; INTRAVENOUS at 04:52

## 2022-07-14 RX ADMIN — OXYCODONE 5 MG: 5 TABLET ORAL at 08:21

## 2022-07-14 RX ADMIN — SODIUM CHLORIDE, PRESERVATIVE FREE 10 ML: 5 INJECTION INTRAVENOUS at 08:24

## 2022-07-14 ASSESSMENT — PAIN DESCRIPTION - PAIN TYPE: TYPE: CHRONIC PAIN

## 2022-07-14 ASSESSMENT — PAIN - FUNCTIONAL ASSESSMENT: PAIN_FUNCTIONAL_ASSESSMENT: ACTIVITIES ARE NOT PREVENTED

## 2022-07-14 ASSESSMENT — PAIN DESCRIPTION - ONSET: ONSET: ON-GOING

## 2022-07-14 ASSESSMENT — PAIN SCALES - GENERAL
PAINLEVEL_OUTOF10: 10
PAINLEVEL_OUTOF10: 5

## 2022-07-14 ASSESSMENT — PAIN DESCRIPTION - LOCATION
LOCATION: BACK;CHEST
LOCATION: BACK;CHEST

## 2022-07-14 ASSESSMENT — PAIN DESCRIPTION - DESCRIPTORS: DESCRIPTORS: ACHING

## 2022-07-14 ASSESSMENT — PAIN DESCRIPTION - ORIENTATION
ORIENTATION: MID
ORIENTATION: MID

## 2022-07-14 ASSESSMENT — PAIN DESCRIPTION - FREQUENCY: FREQUENCY: CONTINUOUS

## 2022-07-14 NOTE — DISCHARGE INSTR - DIET
Good nutrition is important when healing from an illness, injury, or surgery. Follow any nutrition recommendations given to you during your hospital stay. If you were given an oral nutrition supplement while in the hospital, continue to take this supplement at home. You can take it with meals, in-between meals, and/or before bedtime. These supplements can be purchased at most local grocery stores, pharmacies, and chain [employer]-stores. If you have any questions about your diet or nutrition, call the hospital and ask for the dietitian.    - Low sodium diet, low fat diet.

## 2022-07-14 NOTE — PROGRESS NOTES
[facility]  Office: [address], DO, Bettie [place], DO, bAby Marlow, DO, Koki Cloud, DO, Sidney Castro MD, Rashida Pardo MD, Neri Mcelroy MD, Darwin Guthrie MD, Jacki Lieberman MD, Dustin Jacobo MD, Aris Middleton MD, Kelley Donaldson, DO, Dolly Galarza MD,  Quin Friday, DO, Montse Samuel MD, Sepideh Kamara MD, Louis Polanco, DO, Eze Harding MD, Tyrese Rogers MD, Francisco Curtis, DO, Patience De León MD, Gracy Choi MD, Beni Andrade, [facility], [facility], CNP, Anders Maria, CNP, Melissa Newton, CNP, Emily Moody, CNP, Summer Smith, CNP, KRISTI RizviC, Arlen Deng, [facility], Riky Stephens, CNP, Awa Lemus, CNP, [facility], CNP, Karsten Ansari, [facility], Henri Bachelor, [facility], Sukhdeep Ortiz, CNP, [facility], [facility], [facility]    Progress Note    7/14/2022    10:35 AM    Name:   George Hickman  MRN:     [id number]     [place]:      [de-identified]   Room:   [id number] Day:  4  Admit Date:  7/10/2022  7:44 PM    PCP:   No primary care provider on file. Code Status:  Full Code    Subjective:     C/C:   Chief Complaint   Patient presents with    Chest Pain    Nausea    Shortness of Breath     Interval History Status: not changed. Patient seen and examined this morning. No acute events overnight. Continues to have intermittent chest pain. He is frustrated that his ascending thoracic aneurysm is not being intervened on during this hospital stay. I explained why this is the case. Patient agreeable to discharge today. Brief History:     George Hickman is a 54-year-old male who presents to [facility] on 7/10/2022 for evaluation of ongoing chest pain. He has a history of CABG x2 in 2016 in [place]. He also has an AICD present. Associated with his chest pain and shortness of breath and diaphoresis.  He does also report that he has had claudication symptoms affecting the bilateral lower extremities when he is walking as well as occasional numbness of the legs and sometimes the hands. Review of Systems:     Constitutional:  negative for chills, fevers, sweats  Respiratory: Reports occasional dyspnea; negative for cough, wheezing  Cardiovascular: Reports chest pains intermittently; negative for lower extremity edema, palpitations  Gastrointestinal:  negative for abdominal pain, constipation, diarrhea, nausea, vomiting  Neurological:  negative for dizziness, headache  Musculoskeletal: Reports lower extremity burning/tingling with ambulation    Medications: Allergies: Allergies   Allergen Reactions    Morphine Itching       Current Meds:   Scheduled Meds:    empagliflozin  10 mg Oral Daily    metoprolol succinate  50 mg Oral Daily    ranolazine  500 mg Oral BID    isosorbide mononitrate  120 mg Oral Daily    aspirin  81 mg Oral Daily    buPROPion  100 mg Oral TID    [Held by provider] lisinopril  2.5 mg Oral Daily    spironolactone  25 mg Oral Daily    sodium chloride flush  5-40 mL IntraVENous 2 times per day    atorvastatin  80 mg Oral Nightly    enoxaparin  40 mg SubCUTAneous Daily     Continuous Infusions:    dextrose      sodium chloride       PRN Meds: HYDROmorphone, oxyCODONE, cloNIDine, glucose, dextrose bolus **OR** dextrose bolus, glucagon (rDNA), dextrose, sodium chloride flush, sodium chloride, ondansetron **OR** ondansetron, acetaminophen **OR** acetaminophen, polyethylene glycol, nitroGLYCERIN, diazePAM    Data:     Past Medical History:   has a past medical history of AA (alcohol abuse), Coronary artery disease involving native coronary artery of native heart, Drug abuse ([facility] [address] Dyslipidemia, Hepatitis, and Primary hypertension. Social History:   reports that he has never smoked. He has never used smokeless tobacco. He reports previous alcohol use.      Family History:   Family History   Family history unknown: Yes       Vitals:  BP 111/74   Pulse 68   Temp 98.6 °F (37 °C) (Temporal)   Resp 19   Ht 5' 9\" (1.753 m)   Wt 165 lb (74.8 kg)   SpO2 98%   BMI 24.37 kg/m²   Temp (24hrs), Av.6 °F (36.4 °C), Min:96.8 °F (36 °C), Max:98.6 °F (37 °C)    Recent Labs     22  0600   POCGLU 104 101 100 106       I/O (24Hr): Intake/Output Summary (Last 24 hours) at 2022 1035  Last data filed at 2022 0828  Gross per 24 hour   Intake 630 ml   Output 4000 ml   Net -3370 ml       Labs:  Hematology:  Recent Labs     22  0454   WBC 6.9   RBC 4.43   HGB 13.5   HCT 42.0   MCV 94.8   MCH 30.5   MCHC 32.1   RDW 15.1*      MPV 9.5     Chemistry:  Recent Labs     22  0500 22  0418    138 136   K 4.2 4.3 4.2    101 98   CO2 28 33* 29   GLUCOSE 128* 100* 108*   BUN 11 18 18   CREATININE 1.04 1.34* 1.30*   ANIONGAP 7* 4* 9   LABGLOM >60 56* 58*   GFRAA >60 >60 >60   CALCIUM 8.4* 8.4* 8.9     Recent Labs     22  0600   POCGLU 104 101 100 106     ABG:No results found for: POCPH, PHART, PH, POCPCO2, TDB8IEN, PCO2, POCPO2, PO2ART, PO2, POCHCO3, KFC9YAI, HCO3, NBEA, PBEA, BEART, BE, THGBART, THB, FFS1RVX, QIKK6BBO, H6PJAZLC, O2SAT, FIO2  No results found for: SPECIAL  No results found for: CULTURE    Radiology:  XR CERVICAL SPINE (2-3 VIEWS)    Result Date: 2022  No acute lumbar spine abnormality. XR LUMBAR SPINE (2-3 VIEWS)    Result Date: 2022  No acute findings or significant degenerative change. XR CHEST PORTABLE    Result Date: 2022  No acute cardiopulmonary abnormality identified. XR CHEST 1 VIEW    Result Date: 2022  No evidence of acute process.      CTA CHEST ABDOMEN PELVIS W CONTRAST    Result Date: 7/10/2022  Aneurysmal distension of ascending thoracic aorta measuring 5.4 x 4.8 cm in comparison to the normal diameter of the descending thoracic aorta measuring 2.5 x an EF of 30 to 40%. Recommend aggressive risk factor modification, including abstinence from illicit drug use and tobacco use. Addiction medicine is following. Recommend goal-directed medical therapy, including aspirin and statin therapy. Recommend avoidance of beta-blockers for now given daily cocaine use and relative bradycardia. Given chronic chest pain, recommend low-dose Imdur. Formatting of this note might be different from the original.  Added automatically from request for surgery [id number]  Formatting of this note might be different from the original.  Added automatically from request for surgery [id number]    Last Assessment & Plan:   Formatting of this note is different from the original.  H/o CABG in 2016. Cardiac catheterization in 04/2017 with patent LIMA-LAD and SVG-OM, occluded native RCA, medically managed. Last stress test in 05/2018 with fixed anterior/anteroseptal and inferior/inferolateral perfusion defects. He has not been compliant with medications or follow up. Now having chest pain and dyspnea with minimal exertion, associated with diaphoresis, concerning for angina. He also had a reported syncopal episode. EKG with old anteroseptal and lateral infarct unchanged from prior. NSVT noted on telemetry today. Recommendations:  -- Continue Aspirin and Statin  -- Started Carvedilol and Imdur  -- Will add Ranexa  -- Will consider evaluation of his grafts with cardiac catheterization vs coronary CTA. Will discuss with Dr. Christine Henderson on Monday  --10/22-- review of catheterization films demonstrated occlusion of all his native vessels. The mammary artery to his anterior descending was widely patent with no significant disease. The saphenous vein graft to his circumflex was also widely patent with no significant disease. Given the recent performance of the study as well as his absence of any medical therapy I do not feel that repeat catheterization is necessary or indicated at this time.   We will resume medical therapy and plan EP evaluation given his recent syncope and questionable nonsustained VT as well as his diminished ejection fraction. Last Assessment & Plan:   Formatting of this note might be different from the original.  Longstanding history of ischemic cardiomyopathy last cardiac catheterization was in April 2017 at which time he had patent LIMA to the LAD and vein graft to the marginal and EF was 40%. Most recent ischemic evaluation was on 11/20/2019 with a nuclear stress test which showed fixed apical defect with an EF 45%. He has a defibrillator implanted in 2018. He is sporadically compliant with medical therapy but is well compensated from a heart failure perspective. He does not have angina. Risk for orthopedic surgery is acceptable under the circumstances and I would proceed without additional evaluation. --reinstitute medical therapy:  --coreg 6.25mg bid  --lisinopril 5mg daily  --lipitor 80mg daily  --spironoloactone 12.5mg daily  5/6 update: Doing well post op. On appropriate GDMT for HF. Cardio will sign off at this time, please call with any questions         Dyslipidemia 7/11/2022 Yes    Essential hypertension, benign 7/11/2022 Yes    Anxiety 7/11/2022 Yes    Chronic combined systolic and diastolic heart failure ([address] 7/11/2022 Yes    Overview Signed 7/11/2022  5:23 AM by BRAIN Davis - NP     Formatting of this note might be different from the original.        Last Assessment & Plan:   Formatting of this note might be different from the original.  -CXR-negative  -Echo (2/2021) with EF of 16%, (7/2021) with EF of 30%,   -Most recent echo (10/31) with EF of 25-30%  -s/p ICD   -BNP-1,294 , baseline 1-2k  -SPECT (2/2021) with large fixed defect involving anterior, anterolateral, and inferolateral walls.  Severe global hypokinesias with akinesis  -appears relatively euvolemic on exam, on RA, no edema noted   -Continue home GDMT as tolerated, on lisinopril Claudication of both lower extremities ([facility] [address] 7/11/2022 Yes    Ascending aortic aneurysm ([facility] [address] 7/11/2022 Yes          Plan:        1. Stable angina, CAD status post CABG x2 - appreciate cardiology evaluation. Continue aspirin, statin therapy. Continue increased dose of Imdur and Ranexa. Continue Aldactone and Toprol-XL. Stop clonidine. Start Windber at discharge. Currently receiving Jardiance. Restart low dose lisinopril at [place]. 2. Primary hypertension - continue current regimen  3. Ascending AAA - CT surgery eval noted - repeat scan in 3 months with BP control  4. Leg pain/swelling-appreciate vascular surgery input. Following up in the outpatient setting for venous insufficiency  5. Roxicodone at d/c for 3 days  6. Needs to establish care with PCP. Discussed with RN. 7. OK for discharge today  8.  RX refilled per patient request.    Chadd Coombs DO  7/14/2022  10:35 AM

## 2022-07-14 NOTE — DISCHARGE SUMMARY
[facility]  Office: [address], DO, Dexter Paulson, DO, Kris Gregory, DO, Shreya Shirk Blood, DO, Dorinda Franklin MD, Vicki Mercado MD, Geovany Hahn MD, Chanell Hodge MD, Ana Keane MD, Yris Gonsalves MD, Armand Lowry MD, Maddison Olivares, DO, Omar Ordoñez MD,  Evelyn Ornelas, DO, Margret Cano MD, Gavi Jara MD, Coye Meigs, DO, Marques Florentino MD, Eber Yanes MD, Sidney Grover DO, Robyn Leigh MD, Abhijit Barone MD, Mireille Fuchs, [facility], [facility], CNP, Mino Burton, CNP, Jia Weems, CNP, Tomasa Sandra, CNP, Jeovanny Saucedo, CNP, Sascha Rhodaes PA-C, Shreya Polanco, [facility], Víctor Cordon, CNP, Kassidy Valentin, CNP, Mike Gomez, CNP, Anais Owens, CNS, Steff Jimenez, [facility], Ellie Pitts, CNP, Cameron Bellamy, CNP, Ya Otto, [facility]    Discharge Summary     Patient ID: Joey Pan  :  1970   MRN: [id number]     ACCOUNT:  [de-identified]   Patient's PCP: No primary care provider on file. Admit Date: 7/10/2022   Discharge Date: 2022    Length of Stay: 4  Code Status:  Full Code  Admitting Physician: Eduardo Coronado DO  Discharge Physician: Rosio Pendleton DO     Active Discharge Diagnoses:     Hospital Problem Lists:  Principal Problem:    Coronary artery disease of native artery of native heart with stable angina pectoris [facility])  Active Problems:    Dyslipidemia    Essential hypertension, benign    Anxiety    Chronic combined systolic and diastolic heart failure (HCC)    Claudication of both lower extremities ([facility]    Ascending aortic aneurysm ([facility]  Resolved Problems:    * No resolved hospital problems. *    Admission Condition:  good     Discharged Condition: good    Hospital Stay:     Hospital Course:    Joey Pan is a 50-year-old male who presents to [facility] on 7/10/2022 for evaluation of ongoing chest pain.   He has a history of CABG x2 in 2016 in [facility]. He also has an AICD present. Associated with his chest pain and shortness of breath and diaphoresis. He does also report that he has had claudication symptoms affecting the bilateral lower extremities when he is walking as well as occasional numbness of the legs and sometimes the hands. Vascular surgery was consulted for work-up of his possible claudication/peripheral arterial disease. Studies were unrevealing, and he will follow-up with them in the outpatient setting. He did undergo CTA of the chest/abdomen/pelvis which did reveal a 5.4 cm ascending thoracic aortic aneurysm. CT surgery was consulted and measured this as somewhat smaller. He is to follow-up with them in 3 months for repeat scanning. Their information is noted below. Cardiology was consulted due to persistent chest pain. He has been medically managed. Imdur, Ranexa both were added. Patient did have improvement of his pain. He was maintained on Valium (previous home med) and opiate analgesia with improvement of his chest pain. Ultimately, his chest pain is manageable and he will need to follow-up with cardiology in the outpatient setting. Patient agreeable to discharge today. Prescriptions for the below noted medications were written for.       Significant therapeutic interventions:   Multiple consultants  Analgesia  Initiation of Imdur/Ranexa    Significant Diagnostic Studies:   Labs / Micro:  CBC:   Lab Results   Component Value Date/Time    WBC 6.9 07/12/2022 04:54 AM    RBC 4.43 07/12/2022 04:54 AM    HGB 13.5 07/12/2022 04:54 AM    HCT 42.0 07/12/2022 04:54 AM    MCV 94.8 07/12/2022 04:54 AM    MCH 30.5 07/12/2022 04:54 AM    MCHC 32.1 07/12/2022 04:54 AM    RDW 15.1 07/12/2022 04:54 AM     07/12/2022 04:54 AM     CMP:    Lab Results   Component Value Date/Time    GLUCOSE 108 07/14/2022 04:18 AM     07/14/2022 04:18 AM    K 4.2 07/14/2022 04:18 AM    CL 98 07/14/2022 04:18 AM    CO2 29 07/14/2022 04:18 AM    BUN 18 07/14/2022 04:18 AM    CREATININE 1.30 07/14/2022 04:18 AM    ANIONGAP 9 07/14/2022 04:18 AM    ALKPHOS 76 07/05/2022 02:24 AM    ALT 92 07/05/2022 02:24 AM    AST 62 07/05/2022 02:24 AM    BILITOT 0.45 07/05/2022 02:24 AM    LABALBU 3.7 07/05/2022 02:24 AM    LABGLOM 58 07/14/2022 04:18 AM    GFRAA >60 07/14/2022 04:18 AM    GFR      07/14/2022 04:18 AM    PROT 6.3 07/05/2022 02:24 AM    CALCIUM 8.9 07/14/2022 04:18 AM       Radiology:  XR CERVICAL SPINE (2-3 VIEWS)    Result Date: 7/11/2022  No acute lumbar spine abnormality. XR LUMBAR SPINE (2-3 VIEWS)    Result Date: 7/11/2022  No acute findings or significant degenerative change. XR CHEST PORTABLE    Result Date: 7/11/2022  No acute cardiopulmonary abnormality identified. CTA CHEST ABDOMEN PELVIS W CONTRAST    Result Date: 7/10/2022  Aneurysmal distension of ascending thoracic aorta measuring 5.4 x 4.8 cm in comparison to the normal diameter of the descending thoracic aorta measuring 2.5 x 2.6 cm. No acute pathology within the chest abdomen and pelvis. No dissection. No acute intimal injury. No pulmonary embolism. There are scattered areas of atherosclerotic calcification within the internal iliac arterial branches resulting in focal areas of mild to moderate stenosis. RECOMMENDATIONS: Unavailable       Consultations:    Consults:     Final Specialist Recommendations/Findings:   IP CONSULT TO HOSPITALIST  IP CONSULT TO VASCULAR SURGERY  IP CONSULT TO CARDIOLOGY  [facility]      The patient was seen and examined on day of discharge and this discharge summary is in conjunction with any daily progress note from day of discharge.     Discharge plan:     Disposition: Group home    Physician Follow Up:     Siva Garcia MD  [address]  [phone]    Schedule an appointment as soon as possible for a visit in 2 months  For repeat CT aneurysm [address] [facility], [address] [facility] [address]  [phone]    Schedule an appointment as soon as possible for a visit in 1 week  For follow up after hospitalization    Maureen Garcia  [place] [address] [place] [address]  [phone]    Schedule an appointment as soon as possible for a visit in 1 week  For follow-up with the vascular surgeons       Requiring Further Evaluation/Follow Up POST HOSPITALIZATION/Incidental Findings: Needs to follow-up with CT surgery in 3 months for repeat CT imaging of the thoracic aortic aneurysm. Needs to follow-up with cardiology for further work-up/treatment of chest pain. Diet: cardiac diet    Activity: As tolerated    Instructions to Patient:     Establish care with a PCP  Follow-up with cardiology  Follow-up with the cardiothoracic surgeons  Follow-up with the vascular surgeon      Discharge Medications:      Medication List      START taking these medications    dapagliflozin 5 MG tablet  Commonly known as: Farxiga  Take 1 tablet by mouth every morning     oxyCODONE 5 MG immediate release tablet  Commonly known as: ROXICODONE  Take 1 tablet by mouth every 6 hours as needed for Pain for up to 3 days. ranolazine 500 MG extended release tablet  Commonly known as: RANEXA  Take 1 tablet by mouth 2 times daily        CHANGE how you take these medications    buPROPion 100 MG tablet  Commonly known as: WELLBUTRIN  Take 1 tablet by mouth in the morning, at noon, and at bedtime  What changed: Another medication with the same name was removed. Continue taking this medication, and follow the directions you see here. diazePAM 10 MG tablet  Commonly known as: Valium  Take 1 tablet by mouth 2 times daily for 5 days.   What changed: medication strength     isosorbide mononitrate 120 MG extended release tablet  Commonly known as: IMDUR  Take 1 tablet by mouth daily  Start taking on: July 15, 2022  What changed:   · medication strength  · how much to take        CONTINUE taking these medications    Aspirin Low Dose 81 MG chewable tablet  Generic drug: aspirin     atorvastatin 80 MG tablet  Commonly known as: LIPITOR     hydrOXYzine HCl 25 MG tablet  Commonly known as: ATARAX     lisinopril 2.5 MG tablet  Commonly known as: PRINIVIL;ZESTRIL     Melatonin 10 MG Tabs     metoprolol succinate 25 MG extended release tablet  Commonly known as: TOPROL XL  Take 1 tablet by mouth daily     QUEtiapine 50 MG tablet  Commonly known as: SEROQUEL  Take 2 tablets by mouth nightly as needed (sleep) Takes 2 tabs (100mg) nightly prn     spironolactone 25 MG tablet  Commonly known as: ALDACTONE     tamsulosin 0.4 MG capsule  Commonly known as: FLOMAX  Take 1 capsule by mouth daily     Ventolin  (90 Base) MCG/ACT inhaler  Generic drug: albuterol sulfate HFA        STOP taking these medications    cloNIDine 0.1 MG tablet  Commonly known as: CATAPRES     potassium chloride 10 MEQ extended release tablet  Commonly known as: KLOR-CON M           Where to Get Your Medications      These medications were sent to [facility] [address], [facility] [address] [address]    Phone: [phone]   · buPROPion 100 MG tablet  · dapagliflozin 5 MG tablet  · diazePAM 10 MG tablet  · isosorbide mononitrate 120 MG extended release tablet  · oxyCODONE 5 MG immediate release tablet  · QUEtiapine 50 MG tablet  · ranolazine 500 MG extended release tablet  · tamsulosin 0.4 MG capsule         No discharge procedures on file. Time Spent on discharge is  41 mins in patient examination, evaluation, counseling as well as medication reconciliation, prescriptions for required medications, discharge plan and follow up. Electronically signed by   Josué Walker DO  7/14/2022  10:52 AM      Thank you Dr. Arreola primary care provider on file. for the opportunity to be involved in this patient's care.

## 2022-07-14 NOTE — DISCHARGE INSTR - COC
Continuity of Care Form    Patient Name: Siva Block   :  1970  MRN:  [id number]    Admit date:  7/10/2022  Discharge date:  ***    Code Status Order: Full Code   Advance Directives:      Admitting Physician:  Kamini Ramirez DO  PCP: No primary care provider on file. Discharging Nurse: [facility] Unit/Room#: 2035/2035-01  Discharging Unit Phone Number: ***    Emergency Contact:   Extended Emergency Contact Information  Primary Emergency Contact: Aruna Souza  [place] Phone: [phone]  Relation: Other  Preferred language: English   needed?  No    Past Surgical History:  Past Surgical History:   Procedure Laterality Date    CARDIAC CATHETERIZATION      history of 5 stents    CARDIAC DEFIBRILLATOR PLACEMENT      [employer]    CORONARY ARTERY BYPASS GRAFT      2 vessel       Immunization History:   Immunization History   Administered Date(s) Administered    COVID-19, J&J, (age 18y+), IM, 0.5 mL 2021, 2022       Active Problems:  Patient Active Problem List   Diagnosis Code    Myocardial infarction [facility]) I21.9    Coronary artery disease of native artery of native heart with stable angina pectoris ([facility] I25.118    Dyslipidemia E78.5    Hepatitis K75.9    Essential hypertension, benign I10    Anxiety F41.9    Chronic combined systolic and diastolic heart failure (HCC) I50.42    Chronic hepatitis C without hepatic coma (HCC) B18.2    Ischemic cardiomyopathy I25.5    Mixed hyperlipidemia E78.2    Personality disorder ([facility] F60.9    Presence of implantable cardioverter-defibrillator (ICD) Z95.810    Post traumatic stress disorder F43.10    PVC (premature ventricular contraction) I49.3    Schizoaffective disorder (HCC) F25.9    VT (ventricular tachycardia) (HCC) I47.2    Unstable angina (HCC) I20.0    Claudication of both lower extremities (HCC) I73.9    Ascending aortic aneurysm (HCC) I71.2       Isolation/Infection:   Isolation            No Isolation Patient Infection Status       None to display            Nurse Assessment:  Last Vital Signs: /74   Pulse 68   Temp 98.6 °F (37 °C) (Temporal)   Resp 19   Ht 5' 9\" (1.753 m)   Wt 165 lb (74.8 kg)   SpO2 98%   BMI 24.37 kg/m²     Last documented pain score (0-10 scale): Pain Level: 5  Last Weight:   Wt Readings from Last 1 Encounters:   07/14/22 165 lb (74.8 kg)     Mental Status:  {IP PT MENTAL STATUS:20030}    IV Access:  { DESTIN IV ACCESS:044042120}    Nursing Mobility/ADLs:  Walking   {CHP DME TGHJ:563497089}  Transfer  {CHP DME MSUQ:665766926}  Bathing  {CHP DME GFUD:262663756}  Dressing  {CHP DME QDDH:891090077}  Toileting  {CHP DME BTLT:893645489}  Feeding  {CHP DME IFYW:761229270}  Med Admin  {P DME PLPB:149720832}  Med Delivery   { DESTIN MED Delivery:391494205}    Wound Care Documentation and Therapy:        Elimination:  Continence: Bowel: {YES / FL:92317}  Bladder: {YES / XC:69271}  Urinary Catheter: {Urinary Catheter:468927513}   Colostomy/Ileostomy/Ileal Conduit: {YES / LJ:79850}       Date of Last BM: ***    Intake/Output Summary (Last 24 hours) at 7/14/2022 1115  Last data filed at 7/14/2022 0828  Gross per 24 hour   Intake 630 ml   Output 4000 ml   Net -3370 ml     I/O last 3 completed shifts:   In: 455 [P.O.:870]  Out: 3950 [Urine:3950]    Safety Concerns:     [address] Safety Concerns:568211667}    Impairments/Disabilities:      [address] Impairments/Disabilities:231655302}    Nutrition Therapy:  Current Nutrition Therapy:   [address] Diet List:707595518}    Routes of Feeding: {CHP DME Other Feedings:773272714}  Liquids: {Slp liquid thickness:05018}  Daily Fluid Restriction: {CHP DME Yes amt example:405368684}  Last Modified Barium Swallow with Video (Video Swallowing Test): {Done Not Done EKHS:805054716}    Treatments at the Time of Hospital Discharge:   Respiratory Treatments: ***  Oxygen Therapy:  {Therapy; copd oxygen:58809}  Ventilator:    {MH CC Vent IQQI:356455488}    Rehab Therapies: {THERAPEUTIC INTERVENTION:3941968708}  Weight Bearing Status/Restrictions: {[facility] Weight Bearin}  Other Medical Equipment (for information only, NOT a DME order):  {EQUIPMENT:955707706}  Other Treatments: ***    Patient's personal belongings (please select all that are sent with patient):  {[facility] DME Belongings:676683945}    RN SIGNATURE:  {Esignature:126153043}    CASE MANAGEMENT/SOCIAL WORK SECTION    Inpatient Status Date: ***    Readmission Risk Assessment Score:  Readmission Risk              Risk of Unplanned Readmission:  18           Discharging to Facility/ Agency   Name:   Address:  Phone:  Fax:    Dialysis Facility (if applicable)   Name:  Address:  Dialysis Schedule:  Phone:  Fax:    / signature: {Esignature:695043859}    PHYSICIAN SECTION    Prognosis: {Prognosis:3840779518}    Condition at Discharge: [address] [place] [address] Patient Condition:132490432}    Rehab Potential (if transferring to Rehab): {Prognosis:4836787955}    Recommended Labs or Other Treatments After Discharge: ***    Physician Certification: I certify the above information and transfer of Curt Leonard  is necessary for the continuing treatment of the diagnosis listed and that he requires {Admit to Appropriate Level of Care:66287} for {GREATER/LESS:850436789} 30 days.      Update Admission H&P: {P DME Changes in OUTFD:227690729}    PHYSICIAN SIGNATURE:  {Esignature:503125515}

## 2022-08-01 LAB
EKG ATRIAL RATE: 64 BPM
EKG P AXIS: 69 DEGREES
EKG P-R INTERVAL: 188 MS
EKG Q-T INTERVAL: 442 MS
EKG QRS DURATION: 98 MS
EKG QTC CALCULATION (BAZETT): 455 MS
EKG R AXIS: 102 DEGREES
EKG T AXIS: 91 DEGREES
EKG VENTRICULAR RATE: 64 BPM